# Patient Record
Sex: MALE | NOT HISPANIC OR LATINO | Employment: OTHER | URBAN - METROPOLITAN AREA
[De-identification: names, ages, dates, MRNs, and addresses within clinical notes are randomized per-mention and may not be internally consistent; named-entity substitution may affect disease eponyms.]

---

## 2023-04-24 ENCOUNTER — TELEPHONE (OUTPATIENT)
Dept: PRIMARY CARE | Facility: CLINIC | Age: 78
End: 2023-04-24
Payer: MEDICARE

## 2023-04-24 DIAGNOSIS — I25.2 MI, OLD: Primary | ICD-10-CM

## 2023-04-24 DIAGNOSIS — I25.2 MI, OLD: ICD-10-CM

## 2023-04-24 RX ORDER — CLOPIDOGREL BISULFATE 75 MG/1
75 TABLET ORAL SEE ADMIN INSTRUCTIONS
Qty: 135 TABLET | Refills: 1 | Status: SHIPPED | OUTPATIENT
Start: 2023-04-24 | End: 2023-04-25

## 2023-04-24 RX ORDER — CLOPIDOGREL BISULFATE 75 MG/1
75 TABLET ORAL SEE ADMIN INSTRUCTIONS
COMMUNITY
End: 2023-04-24 | Stop reason: SDUPTHER

## 2023-04-24 NOTE — TELEPHONE ENCOUNTER
Patient's Wife called in asked for a refill request from Dr. Dumont on his calcitriol, also patient was admitted to [facility] yesterday could not walk, just FYI, TY AM

## 2023-04-25 RX ORDER — POLYETHYLENE GLYCOL 1450
POWDER (GRAM) MISCELLANEOUS
COMMUNITY
End: 2023-05-26 | Stop reason: ALTCHOICE

## 2023-04-25 RX ORDER — MULTIVIT-MIN/FA/LYCOPEN/LUTEIN .4-300-25
1 TABLET ORAL DAILY
COMMUNITY
Start: 2017-07-20

## 2023-04-25 RX ORDER — ALPRAZOLAM 1 MG/1
1 TABLET ORAL NIGHTLY
COMMUNITY
End: 2024-03-12 | Stop reason: SDUPTHER

## 2023-04-25 RX ORDER — CLOPIDOGREL BISULFATE 75 MG/1
TABLET ORAL
Qty: 90 TABLET | Refills: 3 | Status: SHIPPED | OUTPATIENT
Start: 2023-04-25 | End: 2024-06-03

## 2023-04-25 RX ORDER — ACETAMINOPHEN, DIPHENHYDRAMINE HCL, PHENYLEPHRINE HCL 325; 25; 5 MG/1; MG/1; MG/1
TABLET ORAL
COMMUNITY

## 2023-04-25 RX ORDER — LEVOTHYROXINE SODIUM 100 UG/1
1 TABLET ORAL DAILY
COMMUNITY
Start: 2022-06-27 | End: 2023-06-21 | Stop reason: SDUPTHER

## 2023-04-25 RX ORDER — METOPROLOL SUCCINATE 25 MG/1
25 TABLET, EXTENDED RELEASE ORAL DAILY
COMMUNITY
End: 2023-12-15 | Stop reason: SDUPTHER

## 2023-04-25 RX ORDER — MIRTAZAPINE 30 MG/1
30 TABLET, FILM COATED ORAL NIGHTLY
COMMUNITY
End: 2023-05-30

## 2023-04-25 RX ORDER — LEVOTHYROXINE SODIUM 150 UG/1
1 TABLET ORAL DAILY
COMMUNITY
Start: 2022-05-15 | End: 2023-05-26 | Stop reason: ALTCHOICE

## 2023-04-25 RX ORDER — ROSUVASTATIN CALCIUM 5 MG/1
5 TABLET, COATED ORAL DAILY
COMMUNITY
End: 2023-05-30 | Stop reason: ALTCHOICE

## 2023-04-25 RX ORDER — CALCITRIOL 0.25 UG/1
CAPSULE ORAL
COMMUNITY
Start: 2019-10-08 | End: 2023-06-23 | Stop reason: SDUPTHER

## 2023-04-25 NOTE — TELEPHONE ENCOUNTER
Wife called in with update, Seen by Neurology Dr. Her possible parkinson's would like for you to look at his chart, Advise?

## 2023-05-03 ENCOUNTER — NURSING HOME VISIT (OUTPATIENT)
Dept: POST ACUTE CARE | Facility: EXTERNAL LOCATION | Age: 78
End: 2023-05-03
Payer: MEDICARE

## 2023-05-03 DIAGNOSIS — F02.B0 MODERATE LATE ONSET ALZHEIMER'S DEMENTIA, UNSPECIFIED WHETHER BEHAVIORAL, PSYCHOTIC, OR MOOD DISTURBANCE OR ANXIETY (MULTI): ICD-10-CM

## 2023-05-03 DIAGNOSIS — J18.9 PNEUMONIA DUE TO INFECTIOUS ORGANISM, UNSPECIFIED LATERALITY, UNSPECIFIED PART OF LUNG: Primary | ICD-10-CM

## 2023-05-03 DIAGNOSIS — N18.32 STAGE 3B CHRONIC KIDNEY DISEASE (MULTI): ICD-10-CM

## 2023-05-03 DIAGNOSIS — G30.1 MODERATE LATE ONSET ALZHEIMER'S DEMENTIA, UNSPECIFIED WHETHER BEHAVIORAL, PSYCHOTIC, OR MOOD DISTURBANCE OR ANXIETY (MULTI): ICD-10-CM

## 2023-05-03 DIAGNOSIS — I15.9 SECONDARY HYPERTENSION: ICD-10-CM

## 2023-05-03 PROCEDURE — 99306 1ST NF CARE HIGH MDM 50: CPT | Performed by: INTERNAL MEDICINE

## 2023-05-03 NOTE — PROGRESS NOTES
HISTORY & PHYSICAL    Subjective   Chief complaint: Maycol Christiansen is a 77 y.o. male who is a acute skilled care patient being seen and evaluated for multiple medical problems.  Patient presents for weakness.    HPI:  Patient is a 77 year old male with a past medical history of CKD, GERD, and dysphagia. Patient was presented to the ED with weakness over the past 3 to 4 days, decreased appetite, encephalopathy. Imaging of brain showed no cute findings. X-ray showed possible developing infiltrate. He was on 5 L of oxygen and then was reduced to 3 L. Pt was stable and discharged to a skilled nursing facility for further care and therapy.         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    Personal history of other diseases of the circulatory system 02/16/2015    History of orthostatic hypotension    Personal history of other endocrine, nutritional and metabolic disease 11/04/2015    History of hypothyroidism    Personal history of other mental and behavioral disorders 10/08/2020    History of dementia    Personal history of transient ischemic attack (TIA), and cerebral infarction without residual deficits 07/31/2017    History of stroke    Pneumonia     Weakness        Past Surgical History:   Procedure Laterality Date    HAND SURGERY  04/08/2013    Hand Surgery                                                                                                                                                          KNEE SURGERY  04/08/2013    Knee Surgery    MR HEAD ANGIO WO IV CONTRAST  4/5/2015    MR HEAD ANGIO WO IV CONTRAST 4/5/2015 [facility] CLINICAL LEGACY    MR HEAD ANGIO WO IV CONTRAST   1/25/2017    MR HEAD ANGIO WO IV CONTRAST 1/25/2017 [facility]    MR HEAD ANGIO WO IV CONTRAST  4/24/2023    MR HEAD ANGIO WO IV CONTRAST [facility]    MR NECK ANGIO WO IV CONTRAST  4/5/2015    MR NECK ANGIO WO IV CONTRAST 4/5/2015 [facility]    MR NECK ANGIO WO IV CONTRAST  1/25/2017    MR NECK ANGIO WO IV CONTRAST 1/25/2017 [facility]    MR NECK ANGIO WO IV CONTRAST  4/24/2023    MR NECK ANGIO WO IV CONTRAST [facility]    OTHER SURGICAL HISTORY  01/24/2022    Cystoscopy    OTHER SURGICAL HISTORY  01/24/2022    Hernia repair    OTHER SURGICAL HISTORY  11/04/2015    Incision Of Uvula       Family History   Problem Relation Name Age of Onset    No Known Problems Mother      No Known Problems Father         Social History     Socioeconomic History    Marital status:      Spouse name: Not on file    Number of children: Not on file    Years of education: Not on file    Highest education level: Not on file   Occupational History    Not on file   Tobacco Use    Smoking status: Not on file    Smokeless tobacco: Not on file   Vaping Use    Vaping status: Not on file   Substance and Sexual Activity    Alcohol use: Not on file    Drug use: Not on file    Sexual activity: Not on file   Other Topics Concern    Not on file   Social History Narrative    Not on file     Social Determinants of Health     Financial Resource Strain: Not on file   Food Insecurity: Not on file   Transportation Needs: Not on file   Physical Activity: Not on file   Stress: Not on file   Social Connections: Not on file   Intimate Partner Violence: Not on file   Housing Stability: Not on file       Vital signs: weight 157 lbs (admission)    Objective   Physical Exam  Vitals reviewed.   Constitutional:       Appearance: Normal appearance.   HENT:      Head: Normocephalic and atraumatic.   Cardiovascular:      Rate and Rhythm: Normal rate and regular rhythm.   Pulmonary:      Effort: Pulmonary effort is normal.      Breath  sounds: Normal breath sounds.   Abdominal:      General: Bowel sounds are normal.      Palpations: Abdomen is soft.   Musculoskeletal:      Cervical back: Neck supple.   Skin:     General: Skin is warm and dry.   Neurological:      General: No focal deficit present.      Mental Status: He is alert.   Psychiatric:         Mood and Affect: Mood normal.         Behavior: Behavior is cooperative.         Assessment/Plan   Problem List Items Addressed This Visit    None    Medications, treatments, and labs reviewed  Continue medications and treatments as listed in [facility]    Scribe Attestation  I, Yusuf Sunibmanasa   attest that this documentation has been prepared under the direction and in the presence of Agnieszka Peguero MD.    Provider Attestation - Scribe documentation  All medical record entries made by the Scribe were at my direction and personally dictated by me. I have reviewed the chart and agree that the record accurately reflects my personal performance of the history, physical exam, discussion and plan.    Agnieszka Peguero MD

## 2023-05-03 NOTE — LETTER
Patient: Maycol Christiansen  : 1945    Encounter Date: 2023    HISTORY & PHYSICAL    Subjective  Chief complaint: Maycol Christiansen is a 77 y.o. male who is a acute skilled care patient being seen and evaluated for multiple medical problems.  Patient presents for weakness.    HPI:  Patient is a 77 year old male with a past medical history of CKD, GERD, and dysphagia. Patient was presented to the ED with weakness over the past 3 to 4 days, decreased appetite, encephalopathy. Imaging of brain showed no cute findings. X-ray showed possible developing infiltrate. He was on 5 L of oxygen and then was reduced to 3 L. Pt was stable and discharged to a skilled nursing facility for further care and therapy.         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   • Personal history of other diseases of the circulatory system 2015    History of orthostatic hypotension   • Personal history of other endocrine, nutritional and metabolic disease 2015    History of hypothyroidism   • Personal history of other mental and behavioral disorders 10/08/2020    History of dementia   • Personal history of transient ischemic attack (TIA), and cerebral infarction without residual deficits 2017    History of stroke   • Pneumonia    • Weakness        Past Surgical History:   Procedure Laterality Date   • HAND SURGERY  2013    Hand Surgery                                                                                                                                                         • KNEE SURGERY  2013    Knee Surgery   • MR HEAD ANGIO WO IV CONTRAST  2015      HEAD ANGIO WO IV CONTRAST 4/5/2015 [facility] CLINICAL LEGACY   • MR HEAD ANGIO WO IV CONTRAST  1/25/2017    MR HEAD ANGIO WO IV CONTRAST 1/25/2017 [facility] CLINICAL LEGACY   • MR HEAD ANGIO WO IV CONTRAST  4/24/2023    MR HEAD ANGIO WO IV CONTRAST [facility]   • MR NECK ANGIO WO IV CONTRAST  4/5/2015    MR NECK ANGIO WO IV CONTRAST 4/5/2015 [facility] CLINICAL LEGACY   • MR NECK ANGIO WO IV CONTRAST  1/25/2017    MR NECK ANGIO WO IV CONTRAST 1/25/2017 [facility] CLINICAL LEGACY   • MR NECK ANGIO WO IV CONTRAST  4/24/2023    MR NECK ANGIO WO IV CONTRAST [facility]   • OTHER SURGICAL HISTORY  01/24/2022    Cystoscopy   • OTHER SURGICAL HISTORY  01/24/2022    Hernia repair   • OTHER SURGICAL HISTORY  11/04/2015    Incision Of Uvula       Family History   Problem Relation Name Age of Onset   • No Known Problems Mother     • No Known Problems Father         Social History     Socioeconomic History   • Marital status:      Spouse name: Not on file   • Number of children: Not on file   • Years of education: Not on file   • Highest education level: Not on file   Occupational History   • Not on file   Tobacco Use   • Smoking status: Not on file   • Smokeless tobacco: Not on file   Vaping Use   • Vaping status: Not on file   Substance and Sexual Activity   • Alcohol use: Not on file   • Drug use: Not on file   • Sexual activity: Not on file   Other Topics Concern   • Not on file   Social History Narrative   • Not on file     Social Determinants of Health     Financial Resource Strain: Not on file   Food Insecurity: Not on file   Transportation Needs: Not on file   Physical Activity: Not on file   Stress: Not on file   Social Connections: Not on file   Intimate Partner Violence: Not on file   Housing Stability: Not on file       Vital signs: weight 157 lbs (admission)    Objective  Physical Exam  Vitals reviewed.   Constitutional:       Appearance: Normal appearance.   HENT:      Head: Normocephalic and atraumatic.   Cardiovascular:      Rate and  Rhythm: Normal rate and regular rhythm.   Pulmonary:      Effort: Pulmonary effort is normal.      Breath sounds: Normal breath sounds.   Abdominal:      General: Bowel sounds are normal.      Palpations: Abdomen is soft.   Musculoskeletal:      Cervical back: Neck supple.   Skin:     General: Skin is warm and dry.   Neurological:      General: No focal deficit present.      Mental Status: He is alert.   Psychiatric:         Mood and Affect: Mood normal.         Behavior: Behavior is cooperative.         Assessment/Plan  Problem List Items Addressed This Visit    None    Medications, treatments, and labs reviewed  Continue medications and treatments as listed in [facility]    Scribe Attestation  I, Nette Mcdonald Scribe   attest that this documentation has been prepared under the direction and in the presence of Agnieszka Peguero MD.    Provider Attestation - Scribe documentation  All medical record entries made by the Scribe were at my direction and personally dictated by me. I have reviewed the chart and agree that the record accurately reflects my personal performance of the history, physical exam, discussion and plan.    Agnieszka Peguero MD          Electronically Signed By: Agnieszka Peguero MD   5/3/23  8:09 PM

## 2023-05-05 ENCOUNTER — NURSING HOME VISIT (OUTPATIENT)
Dept: POST ACUTE CARE | Facility: EXTERNAL LOCATION | Age: 78
End: 2023-05-05
Payer: MEDICARE

## 2023-05-05 DIAGNOSIS — F02.B11 MODERATE LATE ONSET ALZHEIMER'S DEMENTIA WITH AGITATION (MULTI): ICD-10-CM

## 2023-05-05 DIAGNOSIS — G30.1 MODERATE LATE ONSET ALZHEIMER'S DEMENTIA WITH AGITATION (MULTI): ICD-10-CM

## 2023-05-05 DIAGNOSIS — R53.1 WEAKNESS: ICD-10-CM

## 2023-05-05 PROCEDURE — 99308 SBSQ NF CARE LOW MDM 20: CPT | Performed by: INTERNAL MEDICINE

## 2023-05-08 PROBLEM — R53.1 WEAKNESS: Status: ACTIVE | Noted: 2023-05-08

## 2023-05-08 NOTE — PROGRESS NOTES
PROGRESS NOTE    Subjective   Chief complaint: Maycol Christiansen is a 77 y.o. male who is an acute skilled patient being seen and evaluated for weakness    HPI:  Patient continues to work in therapy due to weakness and debility. He requires mod/max assist for all transfers and ADL's. No new concerns at this time. No acute distress.       Objective   Vital signs:   121/76, 98%    Physical Exam  Constitutional:       General: He is not in acute distress.  Eyes:      Extraocular Movements: Extraocular movements intact.   Cardiovascular:      Rate and Rhythm: Normal rate and regular rhythm.   Pulmonary:      Effort: Pulmonary effort is normal.      Breath sounds: Normal breath sounds.   Abdominal:      General: Bowel sounds are normal.      Palpations: Abdomen is soft.   Musculoskeletal:      Cervical back: Neck supple.      Right lower leg: No edema.      Left lower leg: No edema.   Neurological:      Mental Status: He is alert.   Psychiatric:         Mood and Affect: Mood normal.         Behavior: Behavior is cooperative.         Assessment/Plan   Problem List Items Addressed This Visit          Nervous    Moderate late onset Alzheimer's dementia (CMS/HCC)     Mentation at baseline            Other    Weakness     Continue therapy          Medications, treatments, and labs reviewed  Continue medications and treatments as listed in [facility]    Agnieszka Peguero MD    1. Weakness        2. Moderate late onset Alzheimer's dementia with agitation (CMS/HCC)             Scribe Attestation  By signing my name below, I, Irving Parker   attest that this documentation has been prepared under the direction and in the presence of Agnieszka Peguero MD.    Provider Attestation - Scribe documentation  All medical record entries made by the Scribe were at my direction and personally dictated by me. I have reviewed the chart and agree that the record accurately reflects my personal performance of the history, physical exam, discussion and  plan.

## 2023-05-10 ENCOUNTER — NURSING HOME VISIT (OUTPATIENT)
Dept: POST ACUTE CARE | Facility: EXTERNAL LOCATION | Age: 78
End: 2023-05-10
Payer: MEDICARE

## 2023-05-10 DIAGNOSIS — R53.1 WEAKNESS: ICD-10-CM

## 2023-05-10 DIAGNOSIS — F02.B0 MODERATE LATE ONSET ALZHEIMER'S DEMENTIA, UNSPECIFIED WHETHER BEHAVIORAL, PSYCHOTIC, OR MOOD DISTURBANCE OR ANXIETY (MULTI): ICD-10-CM

## 2023-05-10 DIAGNOSIS — G30.1 MODERATE LATE ONSET ALZHEIMER'S DEMENTIA, UNSPECIFIED WHETHER BEHAVIORAL, PSYCHOTIC, OR MOOD DISTURBANCE OR ANXIETY (MULTI): ICD-10-CM

## 2023-05-10 DIAGNOSIS — N18.32 STAGE 3B CHRONIC KIDNEY DISEASE (MULTI): ICD-10-CM

## 2023-05-10 DIAGNOSIS — I15.9 SECONDARY HYPERTENSION: ICD-10-CM

## 2023-05-10 PROCEDURE — 99309 SBSQ NF CARE MODERATE MDM 30: CPT | Performed by: INTERNAL MEDICINE

## 2023-05-10 NOTE — LETTER
Patient: Maycol Christiansen  : 1945    Encounter Date: 05/10/2023    PROGRESS NOTE    Subjective  Chief complaint: Maycol Christiansen is a 77 y.o. male who is a long term care patient being seen and evaluated for monthly general medical care and follow-up.    HPI:   patient presents for general medical care and f/u.  Patient seen and examined at bedside.  No issues per nursing.  Patient has no acute complaints.   patient has dementia and requires assist with ADLs.    HTN,  Denies chest pain and headache.  Patient with CKD which is stable. Denies constitutional symptoms. Patient continues to work in therapy.  He is up ambulating several feet with a walker and contact-guard assistance.      Objective  Vital signs:  124/76, 98%    Physical Exam  Constitutional:       General: He is not in acute distress.  Eyes:      Extraocular Movements: Extraocular movements intact.   Cardiovascular:      Rate and Rhythm: Normal rate and regular rhythm.   Pulmonary:      Effort: Pulmonary effort is normal.      Breath sounds: Normal breath sounds.   Abdominal:      General: Bowel sounds are normal.      Palpations: Abdomen is soft.   Musculoskeletal:      Cervical back: Neck supple.      Right lower leg: Edema present.      Left lower leg: Edema present.   Neurological:      Mental Status: He is alert.   Psychiatric:         Mood and Affect: Mood normal.         Behavior: Behavior is cooperative.         Assessment/Plan  Problem List Items Addressed This Visit          Nervous    Moderate late onset Alzheimer's dementia (CMS/HCC)     Mentation at baseline  ST for cognition              Circulatory    Secondary hypertension     BP at goal  Monitor BP  Continue antihypertensives            Genitourinary    Stage 3b chronic kidney disease     Stable  Monitor labs            Other    Weakness     Continue therapy          Medications, treatments, and labs reviewed  Continue medications and treatments as listed in [facility]    Scribe  Attestation  By signing my name below, I, Irving Parker   attest that this documentation has been prepared under the direction and in the presence of Agnieszka Peguero MD.    Provider Attestation - Scribe documentation  All medical record entries made by the Scribe were at my direction and personally dictated by me. I have reviewed the chart and agree that the record accurately reflects my personal performance of the history, physical exam, discussion and plan.    1. Moderate late onset Alzheimer's dementia, unspecified whether behavioral, psychotic, or mood disturbance or anxiety (CMS/HCC)        2. Secondary hypertension        3. Weakness        4. Stage 3b chronic kidney disease               Electronically Signed By: Agnieszka Peguero MD   5/15/23 11:16 AM

## 2023-05-12 ENCOUNTER — NURSING HOME VISIT (OUTPATIENT)
Dept: POST ACUTE CARE | Facility: EXTERNAL LOCATION | Age: 78
End: 2023-05-12
Payer: MEDICARE

## 2023-05-12 DIAGNOSIS — G30.1 MODERATE LATE ONSET ALZHEIMER'S DEMENTIA, UNSPECIFIED WHETHER BEHAVIORAL, PSYCHOTIC, OR MOOD DISTURBANCE OR ANXIETY (MULTI): ICD-10-CM

## 2023-05-12 DIAGNOSIS — R53.1 WEAKNESS: ICD-10-CM

## 2023-05-12 DIAGNOSIS — F02.B0 MODERATE LATE ONSET ALZHEIMER'S DEMENTIA, UNSPECIFIED WHETHER BEHAVIORAL, PSYCHOTIC, OR MOOD DISTURBANCE OR ANXIETY (MULTI): ICD-10-CM

## 2023-05-12 PROCEDURE — 99308 SBSQ NF CARE LOW MDM 20: CPT | Performed by: INTERNAL MEDICINE

## 2023-05-12 NOTE — LETTER
Patient: Maycol Christiansen  : 1945    Encounter Date: 2023    PROGRESS NOTE    Subjective  Chief complaint: Maycol Christiansen is a 77 y.o. male who is an acute skilled patient being seen and evaluated for weakness    HPI:  Patient with ALZ/Dementia continues to work with therapy.  Patient requires assistance with transfers ADLs and mobility. He is able to ambulate with contact-guard assist and walker.  No new issues or concerns.  No acute distress.      Objective  Vital signs: 127/67, 98%    Physical Exam  Constitutional:       General: He is not in acute distress.  Eyes:      Extraocular Movements: Extraocular movements intact.   Cardiovascular:      Rate and Rhythm: Normal rate and regular rhythm.   Pulmonary:      Effort: Pulmonary effort is normal.      Breath sounds: Normal breath sounds.   Abdominal:      General: Bowel sounds are normal.      Palpations: Abdomen is soft.   Musculoskeletal:      Cervical back: Neck supple.      Right lower leg: Edema present.      Left lower leg: Edema present.   Neurological:      Mental Status: He is alert.   Psychiatric:         Mood and Affect: Mood normal.         Behavior: Behavior is cooperative.         Assessment/Plan  Problem List Items Addressed This Visit          Nervous    Moderate late onset Alzheimer's dementia (CMS/HCC)     Mentation at baseline  ST for cognition              Other    Weakness     Continue therapy          Medications, treatments, and labs reviewed  Continue medications and treatments as listed in [facility]    Agnieszka Peguero MD    1. Moderate late onset Alzheimer's dementia, unspecified whether behavioral, psychotic, or mood disturbance or anxiety (CMS/HCC)        2. Weakness             Scribe Attestation  By signing my name below, IFarzaneh, Scribe   attest that this documentation has been prepared under the direction and in the presence of Agnieszka Peguero MD.    Provider Attestation - Scribe documentation  All medical record entries  made by the Scribe were at my direction and personally dictated by me. I have reviewed the chart and agree that the record accurately reflects my personal performance of the history, physical exam, discussion and plan.      Electronically Signed By: Agnieszka Peguero MD   5/16/23 12:16 PM

## 2023-05-15 NOTE — PROGRESS NOTES
PROGRESS NOTE    Subjective   Chief complaint: Maycol Christiansen is a 77 y.o. male who is a long term care patient being seen and evaluated for monthly general medical care and follow-up.    HPI:   patient presents for general medical care and f/u.  Patient seen and examined at bedside.  No issues per nursing.  Patient has no acute complaints.   patient has dementia and requires assist with ADLs.    HTN,  Denies chest pain and headache.  Patient with CKD which is stable. Denies constitutional symptoms. Patient continues to work in therapy.  He is up ambulating several feet with a walker and contact-guard assistance.      Objective   Vital signs:  124/76, 98%    Physical Exam  Constitutional:       General: He is not in acute distress.  Eyes:      Extraocular Movements: Extraocular movements intact.   Cardiovascular:      Rate and Rhythm: Normal rate and regular rhythm.   Pulmonary:      Effort: Pulmonary effort is normal.      Breath sounds: Normal breath sounds.   Abdominal:      General: Bowel sounds are normal.      Palpations: Abdomen is soft.   Musculoskeletal:      Cervical back: Neck supple.      Right lower leg: Edema present.      Left lower leg: Edema present.   Neurological:      Mental Status: He is alert.   Psychiatric:         Mood and Affect: Mood normal.         Behavior: Behavior is cooperative.         Assessment/Plan   Problem List Items Addressed This Visit          Nervous    Moderate late onset Alzheimer's dementia (CMS/HCC)     Mentation at baseline  ST for cognition              Circulatory    Secondary hypertension     BP at goal  Monitor BP  Continue antihypertensives            Genitourinary    Stage 3b chronic kidney disease     Stable  Monitor labs            Other    Weakness     Continue therapy          Medications, treatments, and labs reviewed  Continue medications and treatments as listed in [facility]    Scribe Attestation  By signing my name below, Farzaneh BROWNE, Scribe   attest that  this documentation has been prepared under the direction and in the presence of Agnieszka Peguero MD.    Provider Attestation - Scribe documentation  All medical record entries made by the Scribe were at my direction and personally dictated by me. I have reviewed the chart and agree that the record accurately reflects my personal performance of the history, physical exam, discussion and plan.    1. Moderate late onset Alzheimer's dementia, unspecified whether behavioral, psychotic, or mood disturbance or anxiety (CMS/HCC)        2. Secondary hypertension        3. Weakness        4. Stage 3b chronic kidney disease

## 2023-05-16 NOTE — PROGRESS NOTES
PROGRESS NOTE    Subjective   Chief complaint: Maycol Christiansen is a 77 y.o. male who is an acute skilled patient being seen and evaluated for weakness    HPI:  Patient with ALZ/Dementia continues to work with therapy.  Patient requires assistance with transfers ADLs and mobility. He is able to ambulate with contact-guard assist and walker.  No new issues or concerns.  No acute distress.      Objective   Vital signs: 127/67, 98%    Physical Exam  Constitutional:       General: He is not in acute distress.  Eyes:      Extraocular Movements: Extraocular movements intact.   Cardiovascular:      Rate and Rhythm: Normal rate and regular rhythm.   Pulmonary:      Effort: Pulmonary effort is normal.      Breath sounds: Normal breath sounds.   Abdominal:      General: Bowel sounds are normal.      Palpations: Abdomen is soft.   Musculoskeletal:      Cervical back: Neck supple.      Right lower leg: Edema present.      Left lower leg: Edema present.   Neurological:      Mental Status: He is alert.   Psychiatric:         Mood and Affect: Mood normal.         Behavior: Behavior is cooperative.         Assessment/Plan   Problem List Items Addressed This Visit          Nervous    Moderate late onset Alzheimer's dementia (CMS/HCC)     Mentation at baseline  ST for cognition              Other    Weakness     Continue therapy          Medications, treatments, and labs reviewed  Continue medications and treatments as listed in [facility]    Agnieszka Peguero MD    1. Moderate late onset Alzheimer's dementia, unspecified whether behavioral, psychotic, or mood disturbance or anxiety (CMS/HCC)        2. Weakness             Scribe Attestation  By signing my name below, Farzaneh BROWNE Scribe   attest that this documentation has been prepared under the direction and in the presence of Agnieszka Peguero MD.    Provider Attestation - Scribe documentation  All medical record entries made by the Scribe were at my direction and personally dictated by  me. I have reviewed the chart and agree that the record accurately reflects my personal performance of the history, physical exam, discussion and plan.

## 2023-05-17 ENCOUNTER — NURSING HOME VISIT (OUTPATIENT)
Dept: POST ACUTE CARE | Facility: EXTERNAL LOCATION | Age: 78
End: 2023-05-17
Payer: MEDICARE

## 2023-05-17 DIAGNOSIS — F02.B0 MODERATE LATE ONSET ALZHEIMER'S DEMENTIA, UNSPECIFIED WHETHER BEHAVIORAL, PSYCHOTIC, OR MOOD DISTURBANCE OR ANXIETY (MULTI): ICD-10-CM

## 2023-05-17 DIAGNOSIS — G30.1 MODERATE LATE ONSET ALZHEIMER'S DEMENTIA, UNSPECIFIED WHETHER BEHAVIORAL, PSYCHOTIC, OR MOOD DISTURBANCE OR ANXIETY (MULTI): ICD-10-CM

## 2023-05-17 DIAGNOSIS — R53.1 WEAKNESS: ICD-10-CM

## 2023-05-17 PROCEDURE — 99308 SBSQ NF CARE LOW MDM 20: CPT | Performed by: INTERNAL MEDICINE

## 2023-05-17 NOTE — LETTER
Patient: Maycol Christiansen  : 1945    Encounter Date: 2023    PROGRESS NOTE    Subjective  Chief complaint: Maycol Christiansen is a 77 y.o. male who is an acute skilled patient being seen and evaluated for weakness    HPI:  Patient with ALZ/Dementia continues to work with therapy.  Patient requires assistance with transfers ADLs and mobility. He walks with contact-guard assist and walker.  No new issues or concerns.        Objective  Vital signs: 126/64, 98%    Physical Exam  Constitutional:       General: He is not in acute distress.  Eyes:      Extraocular Movements: Extraocular movements intact.   Cardiovascular:      Rate and Rhythm: Normal rate and regular rhythm.   Pulmonary:      Effort: Pulmonary effort is normal.      Breath sounds: Normal breath sounds.   Abdominal:      General: Bowel sounds are normal.      Palpations: Abdomen is soft.   Musculoskeletal:      Cervical back: Neck supple.      Right lower leg: Edema present.      Left lower leg: Edema present.   Neurological:      Mental Status: He is alert.   Psychiatric:         Mood and Affect: Mood normal.         Behavior: Behavior is cooperative.         Assessment/Plan  Problem List Items Addressed This Visit          Nervous    Moderate late onset Alzheimer's dementia (CMS/HCC)     Mentation at baseline  ST for cognition              Other    Weakness     Continue therapy        Medications, treatments, and labs reviewed  Continue medications and treatments as listed in PCC    Agnieszka Peguero MD    1. Moderate late onset Alzheimer's dementia, unspecified whether behavioral, psychotic, or mood disturbance or anxiety (CMS/HCC)        2. Weakness             Scribe Attestation  By signing my name below, IFarzaneh, Irving   attest that this documentation has been prepared under the direction and in the presence of Agnieszka Peguero MD.    Provider Attestation - Scribe documentation  All medical record entries made by the Scribe were at my  direction and personally dictated by me. I have reviewed the chart and agree that the record accurately reflects my personal performance of the history, physical exam, discussion and plan.      Electronically Signed By: Agnieszka Peguero MD   5/17/23  8:24 PM

## 2023-05-18 NOTE — PROGRESS NOTES
PROGRESS NOTE    Subjective   Chief complaint: Maycol Christiansen is a 77 y.o. male who is an acute skilled patient being seen and evaluated for weakness    HPI:  Patient with ALZ/Dementia continues to work with therapy.  Patient requires assistance with transfers ADLs and mobility. He walks with contact-guard assist and walker.  No new issues or concerns.        Objective   Vital signs: 126/64, 98%    Physical Exam  Constitutional:       General: He is not in acute distress.  Eyes:      Extraocular Movements: Extraocular movements intact.   Cardiovascular:      Rate and Rhythm: Normal rate and regular rhythm.   Pulmonary:      Effort: Pulmonary effort is normal.      Breath sounds: Normal breath sounds.   Abdominal:      General: Bowel sounds are normal.      Palpations: Abdomen is soft.   Musculoskeletal:      Cervical back: Neck supple.      Right lower leg: Edema present.      Left lower leg: Edema present.   Neurological:      Mental Status: He is alert.   Psychiatric:         Mood and Affect: Mood normal.         Behavior: Behavior is cooperative.         Assessment/Plan   Problem List Items Addressed This Visit          Nervous    Moderate late onset Alzheimer's dementia (CMS/HCC)     Mentation at baseline  ST for cognition              Other    Weakness     Continue therapy        Medications, treatments, and labs reviewed  Continue medications and treatments as listed in PCC    Agnieszka Peguero MD    1. Moderate late onset Alzheimer's dementia, unspecified whether behavioral, psychotic, or mood disturbance or anxiety (CMS/HCC)        2. Weakness             Scribe Attestation  By signing my name below, IFarzaneh Scribe   attest that this documentation has been prepared under the direction and in the presence of Agnieszka Peguero MD.    Provider Attestation - Scribe documentation  All medical record entries made by the Scribe were at my direction and personally dictated by me. I have reviewed the chart and  agree that the record accurately reflects my personal performance of the history, physical exam, discussion and plan.

## 2023-05-19 ENCOUNTER — NURSING HOME VISIT (OUTPATIENT)
Dept: POST ACUTE CARE | Facility: EXTERNAL LOCATION | Age: 78
End: 2023-05-19
Payer: MEDICARE

## 2023-05-19 DIAGNOSIS — R53.1 WEAKNESS: ICD-10-CM

## 2023-05-19 DIAGNOSIS — F02.B0 MODERATE LATE ONSET ALZHEIMER'S DEMENTIA, UNSPECIFIED WHETHER BEHAVIORAL, PSYCHOTIC, OR MOOD DISTURBANCE OR ANXIETY (MULTI): ICD-10-CM

## 2023-05-19 DIAGNOSIS — R60.9 EDEMA, UNSPECIFIED TYPE: ICD-10-CM

## 2023-05-19 DIAGNOSIS — G30.1 MODERATE LATE ONSET ALZHEIMER'S DEMENTIA, UNSPECIFIED WHETHER BEHAVIORAL, PSYCHOTIC, OR MOOD DISTURBANCE OR ANXIETY (MULTI): ICD-10-CM

## 2023-05-19 PROCEDURE — 99309 SBSQ NF CARE MODERATE MDM 30: CPT | Performed by: INTERNAL MEDICINE

## 2023-05-19 NOTE — LETTER
Patient: Maycol Christiansen  : 1945    Encounter Date: 2023    PROGRESS NOTE    Subjective  Chief complaint: Maycol Christiansen is a 77 y.o. male who is an acute skilled patient being seen and evaluated for weakness    HPI:  Patient with ALZ/Dementia continues to work with therapy. He walks with contact-guard assist and walker.  No new issues or concerns.  No acute distress.       Objective  Vital signs: 124/66, 98%    Physical Exam  Constitutional:       General: He is not in acute distress.  Eyes:      Extraocular Movements: Extraocular movements intact.   Cardiovascular:      Rate and Rhythm: Normal rate and regular rhythm.   Pulmonary:      Effort: Pulmonary effort is normal.      Breath sounds: Normal breath sounds.   Abdominal:      General: Bowel sounds are normal.      Palpations: Abdomen is soft.   Musculoskeletal:      Cervical back: Neck supple.      Right lower leg: Edema present.      Left lower leg: Edema present.   Neurological:      Mental Status: He is alert.   Psychiatric:         Mood and Affect: Mood normal.         Behavior: Behavior is cooperative.         Assessment/Plan  Problem List Items Addressed This Visit          Nervous    Moderate late onset Alzheimer's dementia (CMS/HCC)     Mentation at baseline  ST for cognition              Other    Weakness     Continue therapy         Edema     Elevate BLE as tolerated  Monitor for worsening edema        Medications, treatments, and labs reviewed  Continue medications and treatments as listed in PCC    Agnieszka Peguero MD    1. Moderate late onset Alzheimer's dementia, unspecified whether behavioral, psychotic, or mood disturbance or anxiety (CMS/HCC)        2. Weakness        3. Edema, unspecified type             Scribe Attestation  By signing my name below, IFarzaneh, Scribe   attest that this documentation has been prepared under the direction and in the presence of Agnieszka Peguero MD.    Provider Attestation - Scribe  documentation  All medical record entries made by the Scribe were at my direction and personally dictated by me. I have reviewed the chart and agree that the record accurately reflects my personal performance of the history, physical exam, discussion and plan.      Electronically Signed By: Agnieszka Peguero MD   5/22/23  2:15 PM

## 2023-05-20 PROBLEM — R60.9 EDEMA: Status: ACTIVE | Noted: 2023-05-20

## 2023-05-20 NOTE — PROGRESS NOTES
PROGRESS NOTE    Subjective   Chief complaint: Maycol Crhistiansen is a 77 y.o. male who is an acute skilled patient being seen and evaluated for weakness    HPI:  Patient with ALZ/Dementia continues to work with therapy. He walks with contact-guard assist and walker.  No new issues or concerns.  No acute distress.       Objective   Vital signs: 124/66, 98%    Physical Exam  Constitutional:       General: He is not in acute distress.  Eyes:      Extraocular Movements: Extraocular movements intact.   Cardiovascular:      Rate and Rhythm: Normal rate and regular rhythm.   Pulmonary:      Effort: Pulmonary effort is normal.      Breath sounds: Normal breath sounds.   Abdominal:      General: Bowel sounds are normal.      Palpations: Abdomen is soft.   Musculoskeletal:      Cervical back: Neck supple.      Right lower leg: Edema present.      Left lower leg: Edema present.   Neurological:      Mental Status: He is alert.   Psychiatric:         Mood and Affect: Mood normal.         Behavior: Behavior is cooperative.         Assessment/Plan   Problem List Items Addressed This Visit          Nervous    Moderate late onset Alzheimer's dementia (CMS/HCC)     Mentation at baseline  ST for cognition              Other    Weakness     Continue therapy         Edema     Elevate BLE as tolerated  Monitor for worsening edema        Medications, treatments, and labs reviewed  Continue medications and treatments as listed in PCC    Agnieszka Peguero MD    1. Moderate late onset Alzheimer's dementia, unspecified whether behavioral, psychotic, or mood disturbance or anxiety (CMS/HCC)        2. Weakness        3. Edema, unspecified type             Scribe Attestation  By signing my name below, Farzaneh BROWNE Scribe   attest that this documentation has been prepared under the direction and in the presence of Agnieszka Peguero MD.    Provider Attestation - Scribe documentation  All medical record entries made by the Scribe were at my direction  and personally dictated by me. I have reviewed the chart and agree that the record accurately reflects my personal performance of the history, physical exam, discussion and plan.

## 2023-05-24 ENCOUNTER — NURSING HOME VISIT (OUTPATIENT)
Dept: POST ACUTE CARE | Facility: EXTERNAL LOCATION | Age: 78
End: 2023-05-24
Payer: MEDICARE

## 2023-05-24 DIAGNOSIS — F02.B0 MODERATE LATE ONSET ALZHEIMER'S DEMENTIA, UNSPECIFIED WHETHER BEHAVIORAL, PSYCHOTIC, OR MOOD DISTURBANCE OR ANXIETY (MULTI): ICD-10-CM

## 2023-05-24 DIAGNOSIS — G30.1 MODERATE LATE ONSET ALZHEIMER'S DEMENTIA, UNSPECIFIED WHETHER BEHAVIORAL, PSYCHOTIC, OR MOOD DISTURBANCE OR ANXIETY (MULTI): ICD-10-CM

## 2023-05-24 DIAGNOSIS — R53.1 WEAKNESS: ICD-10-CM
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
## 2023-05-24 PROCEDURE — 99308 SBSQ NF CARE LOW MDM 20: CPT | Performed by: INTERNAL MEDICINE

## 2023-05-24 NOTE — LETTER
Patient: Maycol Christiansen  : 1945    Encounter Date: 2023    PROGRESS NOTE    Subjective  Chief complaint: Maycol Christiansen is a 77 y.o. male who is an acute skilled patient being seen and evaluated for weakness    HPI:  Patient with ALZ/Dementia continues to work with therapy. He walks with contact-guard assist and walker.  Patient had 2 falls and sent to Ed for eval. Scans and x-rays negative and no acute findings on exam. He denies pain or injuries. No new issues or concerns.      Objective  Vital signs: 132/74, 98%    Physical Exam  Constitutional:       General: He is not in acute distress.  Eyes:      Extraocular Movements: Extraocular movements intact.   Cardiovascular:      Rate and Rhythm: Normal rate and regular rhythm.   Pulmonary:      Effort: Pulmonary effort is normal.      Breath sounds: Normal breath sounds.   Abdominal:      General: Bowel sounds are normal.      Palpations: Abdomen is soft.   Musculoskeletal:         General: Normal range of motion.      Cervical back: Normal range of motion and neck supple.      Right lower leg: Edema present.      Left lower leg: Edema present.   Neurological:      Mental Status: He is alert.   Psychiatric:         Mood and Affect: Mood normal.         Behavior: Behavior is cooperative.         Assessment/Plan  Problem List Items Addressed This Visit          Nervous    Moderate late onset Alzheimer's dementia (CMS/HCC)     Mentation at baseline  ST for cognition                Other    Weakness     Continue therapy         Fall     No apparent injuries  ED visit with no acute findings  Denies pain or injury          Medications, treatments, and labs reviewed  Continue medications and treatments as listed in PCC    Agnieszka Peguero MD    1. Fall, initial encounter        2. Weakness        3. Moderate late onset Alzheimer's dementia, unspecified whether behavioral, psychotic, or mood disturbance or anxiety (CMS/HCC)             Scribe Attestation  By  signing my name below, I, Irving Parker   attest that this documentation has been prepared under the direction and in the presence of Agnieszka Peguero MD.    Provider Attestation - Scribe documentation  All medical record entries made by the Scribe were at my direction and personally dictated by me. I have reviewed the chart and agree that the record accurately reflects my personal performance of the history, physical exam, discussion and plan.      Electronically Signed By: Agnieszka Peguero MD   5/24/23  4:40 PM

## 2023-05-24 NOTE — PROGRESS NOTES
PROGRESS NOTE    Subjective   Chief complaint: Maycol Christiansen is a 77 y.o. male who is an acute skilled patient being seen and evaluated for weakness    HPI:  Patient with ALZ/Dementia continues to work with therapy. He walks with contact-guard assist and walker.  Patient had 2 falls and sent to Ed for eval. Scans and x-rays negative and no acute findings on exam. He denies pain or injuries. No new issues or concerns.      Objective   Vital signs: 132/74, 98%    Physical Exam  Constitutional:       General: He is not in acute distress.  Eyes:      Extraocular Movements: Extraocular movements intact.   Cardiovascular:      Rate and Rhythm: Normal rate and regular rhythm.   Pulmonary:      Effort: Pulmonary effort is normal.      Breath sounds: Normal breath sounds.   Abdominal:      General: Bowel sounds are normal.      Palpations: Abdomen is soft.   Musculoskeletal:         General: Normal range of motion.      Cervical back: Normal range of motion and neck supple.      Right lower leg: Edema present.      Left lower leg: Edema present.   Neurological:      Mental Status: He is alert.   Psychiatric:         Mood and Affect: Mood normal.         Behavior: Behavior is cooperative.         Assessment/Plan   Problem List Items Addressed This Visit          Nervous    Moderate late onset Alzheimer's dementia (CMS/HCC)     Mentation at baseline  ST for cognition                Other    Weakness     Continue therapy         Fall     No apparent injuries  ED visit with no acute findings  Denies pain or injury          Medications, treatments, and labs reviewed  Continue medications and treatments as listed in PCC    Agnieszka Peguero MD    1. Fall, initial encounter        2. Weakness        3. Moderate late onset Alzheimer's dementia, unspecified whether behavioral, psychotic, or mood disturbance or anxiety (CMS/HCC)             Scribe Attestation  By signing my name below, I, Farzaneh Conner, Scribe   attest that this  documentation has been prepared under the direction and in the presence of Agnieszka Peguero MD.    Provider Attestation - Scribe documentation  All medical record entries made by the Scribe were at my direction and personally dictated by me. I have reviewed the chart and agree that the record accurately reflects my personal performance of the history, physical exam, discussion and plan.

## 2023-05-26 ENCOUNTER — NURSING HOME VISIT (OUTPATIENT)
Dept: POST ACUTE CARE | Facility: EXTERNAL LOCATION | Age: 78
End: 2023-05-26
Payer: MEDICARE

## 2023-05-26 DIAGNOSIS — R53.1 WEAKNESS: ICD-10-CM

## 2023-05-26 DIAGNOSIS — F02.B0 MODERATE LATE ONSET ALZHEIMER'S DEMENTIA, UNSPECIFIED WHETHER BEHAVIORAL, PSYCHOTIC, OR MOOD DISTURBANCE OR ANXIETY (MULTI): ICD-10-CM

## 2023-05-26 DIAGNOSIS — G30.1 MODERATE LATE ONSET ALZHEIMER'S DEMENTIA, UNSPECIFIED WHETHER BEHAVIORAL, PSYCHOTIC, OR MOOD DISTURBANCE OR ANXIETY (MULTI): ICD-10-CM

## 2023-05-26 PROCEDURE — 99308 SBSQ NF CARE LOW MDM 20: CPT | Performed by: INTERNAL MEDICINE

## 2023-05-26 RX ORDER — CARVEDILOL 6.25 MG/1
TABLET ORAL 2 TIMES DAILY
COMMUNITY
Start: 2023-04-28 | End: 2024-03-28 | Stop reason: ENTERED-IN-ERROR

## 2023-05-26 NOTE — LETTER
Patient: Maycol Christiansen  : 1945    Encounter Date: 2023    PROGRESS NOTE    Subjective  Chief complaint: Maycol Christiansen is a 77 y.o. male who is an acute skilled patient being seen and evaluated for weakness    HPI:  Patient with ALZ/Dementia working in therapy. He is doing well and is ip walking several feet with walker and CGA to SB. No new concerns at this time. No acute distress.      Objective  Vital signs: 129/76, 98%    Physical Exam  Constitutional:       General: He is not in acute distress.  Eyes:      Extraocular Movements: Extraocular movements intact.   Cardiovascular:      Rate and Rhythm: Normal rate and regular rhythm.   Pulmonary:      Effort: Pulmonary effort is normal.      Breath sounds: Normal breath sounds.   Abdominal:      General: Bowel sounds are normal.      Palpations: Abdomen is soft.   Musculoskeletal:         General: Normal range of motion.      Cervical back: Normal range of motion and neck supple.      Right lower leg: Edema present.      Left lower leg: Edema present.   Neurological:      Mental Status: He is alert.   Psychiatric:         Mood and Affect: Mood normal.         Behavior: Behavior is cooperative.         Assessment/Plan  Problem List Items Addressed This Visit          Nervous    Moderate late onset Alzheimer's dementia (CMS/HCC)     Mentation at baseline  ST for cognition                Other    Weakness     Continue therapy          Medications, treatments, and labs reviewed  Continue medications and treatments as listed in PCC    Agnieszka Peguero MD    1. Weakness        2. Moderate late onset Alzheimer's dementia, unspecified whether behavioral, psychotic, or mood disturbance or anxiety (CMS/HCC)             Scribe Attestation  By signing my name below, IFarzaneh, Scribe   attest that this documentation has been prepared under the direction and in the presence of Agnieszka Peguero MD.    Provider Attestation - Scribe documentation  All medical  record entries made by the Scribe were at my direction and personally dictated by me. I have reviewed the chart and agree that the record accurately reflects my personal performance of the history, physical exam, discussion and plan.      Electronically Signed By: Agnieszka Peguero MD   5/30/23  7:44 PM

## 2023-05-30 ENCOUNTER — OFFICE VISIT (OUTPATIENT)
Dept: PRIMARY CARE | Facility: CLINIC | Age: 78
End: 2023-05-30
Payer: MEDICARE

## 2023-05-30 VITALS
HEART RATE: 61 BPM | OXYGEN SATURATION: 99 % | RESPIRATION RATE: 16 BRPM | TEMPERATURE: 98.2 F | WEIGHT: 148 LBS | HEIGHT: 70 IN | BODY MASS INDEX: 21.19 KG/M2 | SYSTOLIC BLOOD PRESSURE: 132 MMHG | DIASTOLIC BLOOD PRESSURE: 74 MMHG

## 2023-05-30 DIAGNOSIS — Z99.89 AMBULATES WITH CANE: ICD-10-CM

## 2023-05-30 DIAGNOSIS — M21.371 RIGHT FOOT DROP: ICD-10-CM

## 2023-05-30 PROCEDURE — 3075F SYST BP GE 130 - 139MM HG: CPT | Performed by: INTERNAL MEDICINE

## 2023-05-30 PROCEDURE — 1157F ADVNC CARE PLAN IN RCRD: CPT | Performed by: INTERNAL MEDICINE

## 2023-05-30 PROCEDURE — 3078F DIAST BP <80 MM HG: CPT | Performed by: INTERNAL MEDICINE

## 2023-05-30 PROCEDURE — 1159F MED LIST DOCD IN RCRD: CPT | Performed by: INTERNAL MEDICINE

## 2023-05-30 PROCEDURE — 1036F TOBACCO NON-USER: CPT | Performed by: INTERNAL MEDICINE

## 2023-05-30 PROCEDURE — 99214 OFFICE O/P EST MOD 30 MIN: CPT | Performed by: INTERNAL MEDICINE

## 2023-05-30 RX ORDER — ATORVASTATIN CALCIUM 10 MG/1
10 TABLET, FILM COATED ORAL DAILY
COMMUNITY
End: 2023-06-23 | Stop reason: ALTCHOICE

## 2023-05-30 RX ORDER — DOCUSATE SODIUM 100 MG/1
100 CAPSULE, LIQUID FILLED ORAL 2 TIMES DAILY
COMMUNITY

## 2023-05-30 ASSESSMENT — ENCOUNTER SYMPTOMS
LOSS OF SENSATION IN FEET: 1
OCCASIONAL FEELINGS OF UNSTEADINESS: 1
DEPRESSION: 0

## 2023-05-30 ASSESSMENT — ANXIETY QUESTIONNAIRES
5. BEING SO RESTLESS THAT IT IS HARD TO SIT STILL: MORE THAN HALF THE DAYS
IF YOU CHECKED OFF ANY PROBLEMS ON THIS QUESTIONNAIRE, HOW DIFFICULT HAVE THESE PROBLEMS MADE IT FOR YOU TO DO YOUR WORK, TAKE CARE OF THINGS AT HOME, OR GET ALONG WITH OTHER PEOPLE: SOMEWHAT DIFFICULT
1. FEELING NERVOUS, ANXIOUS, OR ON EDGE: MORE THAN HALF THE DAYS
GAD7 TOTAL SCORE: 17
6. BECOMING EASILY ANNOYED OR IRRITABLE: MORE THAN HALF THE DAYS
4. TROUBLE RELAXING: NEARLY EVERY DAY
3. WORRYING TOO MUCH ABOUT DIFFERENT THINGS: NEARLY EVERY DAY
7. FEELING AFRAID AS IF SOMETHING AWFUL MIGHT HAPPEN: MORE THAN HALF THE DAYS
2. NOT BEING ABLE TO STOP OR CONTROL WORRYING: NEARLY EVERY DAY

## 2023-05-30 ASSESSMENT — PATIENT HEALTH QUESTIONNAIRE - PHQ9
5. POOR APPETITE OR OVEREATING: SEVERAL DAYS
2. FEELING DOWN, DEPRESSED OR HOPELESS: MORE THAN HALF THE DAYS
1. LITTLE INTEREST OR PLEASURE IN DOING THINGS: MORE THAN HALF THE DAYS
4. FEELING TIRED OR HAVING LITTLE ENERGY: NEARLY EVERY DAY
3. TROUBLE FALLING OR STAYING ASLEEP OR SLEEPING TOO MUCH: SEVERAL DAYS
6. FEELING BAD ABOUT YOURSELF - OR THAT YOU ARE A FAILURE OR HAVE LET YOURSELF OR YOUR FAMILY DOWN: MORE THAN HALF THE DAYS
10. IF YOU CHECKED OFF ANY PROBLEMS, HOW DIFFICULT HAVE THESE PROBLEMS MADE IT FOR YOU TO DO YOUR WORK, TAKE CARE OF THINGS AT HOME, OR GET ALONG WITH OTHER PEOPLE: SOMEWHAT DIFFICULT
SUM OF ALL RESPONSES TO PHQ9 QUESTIONS 1 AND 2: 4
7. TROUBLE CONCENTRATING ON THINGS, SUCH AS READING THE NEWSPAPER OR WATCHING TELEVISION: MORE THAN HALF THE DAYS
SUM OF ALL RESPONSES TO PHQ QUESTIONS 1-9: 14
9. THOUGHTS THAT YOU WOULD BE BETTER OFF DEAD, OR OF HURTING YOURSELF: NOT AT ALL
8. MOVING OR SPEAKING SO SLOWLY THAT OTHER PEOPLE COULD HAVE NOTICED. OR THE OPPOSITE, BEING SO FIGETY OR RESTLESS THAT YOU HAVE BEEN MOVING AROUND A LOT MORE THAN USUAL: SEVERAL DAYS

## 2023-05-30 ASSESSMENT — PAIN SCALES - GENERAL: PAINLEVEL: 3

## 2023-05-30 NOTE — PROGRESS NOTES
PROGRESS NOTE    Subjective   Chief complaint: Maycol Christiansen is a 77 y.o. male who is an acute skilled patient being seen and evaluated for weakness    HPI:  Patient with ALZ/Dementia working in therapy. He is doing well and is ip walking several feet with walker and CGA to SB. No new concerns at this time. No acute distress.      Objective   Vital signs: 129/76, 98%    Physical Exam  Constitutional:       General: He is not in acute distress.  Eyes:      Extraocular Movements: Extraocular movements intact.   Cardiovascular:      Rate and Rhythm: Normal rate and regular rhythm.   Pulmonary:      Effort: Pulmonary effort is normal.      Breath sounds: Normal breath sounds.   Abdominal:      General: Bowel sounds are normal.      Palpations: Abdomen is soft.   Musculoskeletal:         General: Normal range of motion.      Cervical back: Normal range of motion and neck supple.      Right lower leg: Edema present.      Left lower leg: Edema present.   Neurological:      Mental Status: He is alert.   Psychiatric:         Mood and Affect: Mood normal.         Behavior: Behavior is cooperative.         Assessment/Plan   Problem List Items Addressed This Visit          Nervous    Moderate late onset Alzheimer's dementia (CMS/HCC)     Mentation at baseline  ST for cognition                Other    Weakness     Continue therapy          Medications, treatments, and labs reviewed  Continue medications and treatments as listed in PCC    Agnieszka Peguero MD    1. Weakness        2. Moderate late onset Alzheimer's dementia, unspecified whether behavioral, psychotic, or mood disturbance or anxiety (CMS/HCC)             Scribe Attestation  By signing my name below, Farzaneh BROWNE, Irving   attest that this documentation has been prepared under the direction and in the presence of Agnieszka Peguero MD.    Provider Attestation - Scribe documentation  All medical record entries made by the Scribe were at my direction and personally  dictated by me. I have reviewed the chart and agree that the record accurately reflects my personal performance of the history, physical exam, discussion and plan.

## 2023-05-30 NOTE — PROGRESS NOTES
Patient is here for a hospital f/up has paperwork. Wife is accompanying him with concerns, and medication list.   Right foot pain, drops when walking with big toe crossover  Cold all the time,   Different provider stopped metoprolol and changed to carvedilol has concerns why

## 2023-05-30 NOTE — PROGRESS NOTES
"Subjective   Patient ID: Maycol Christiansen is a 77 y.o. male who presents for Hospital Follow-up.  In for follow up for assistance with AMBULATION.        Review of Systems   All other systems reviewed and are negative.      Objective   Physical Exam  Constitutional:       Appearance: Normal appearance.   HENT:      Head: Normocephalic and atraumatic.      Nose: Nose normal.   Cardiovascular:      Rate and Rhythm: Normal rate and regular rhythm.   Abdominal:      General: Abdomen is flat.      Palpations: Abdomen is soft.   Musculoskeletal:         General: Normal range of motion.      Cervical back: Normal range of motion.   Skin:     General: Skin is warm and dry.   Neurological:      Mental Status: He is alert.       /74 (BP Location: Left arm)   Pulse 61   Temp 36.8 °C (98.2 °F)   Resp 16   Ht 1.765 m (5' 9.5\")   Wt 67.1 kg (148 lb)   SpO2 99%   BMI 21.54 kg/m²         Assessment/Plan   Problem List Items Addressed This Visit          Musculoskeletal    Right foot drop    Relevant Orders    Referral to Podiatry    [employer]       Other    Ambulates with cane    Relevant Orders    Referral to Physical Therapy     Physical Therapy ordered. As well as Ortho and Orthotic. Maycol will follow up in one month,    "

## 2023-05-31 ENCOUNTER — TELEPHONE (OUTPATIENT)
Dept: PRIMARY CARE | Facility: CLINIC | Age: 78
End: 2023-05-31
Payer: MEDICARE

## 2023-05-31 NOTE — TELEPHONE ENCOUNTER
PT for the next 3 weeks [facility] will be giving him PT/OT in the home 1 time for 3wks 3 x a week for two ect.... verbal will faxover for sig

## 2023-06-15 DIAGNOSIS — E78.2 MIXED HYPERLIPIDEMIA: Primary | ICD-10-CM

## 2023-06-15 RX ORDER — ROSUVASTATIN CALCIUM 5 MG/1
5 TABLET, COATED ORAL DAILY
Qty: 90 TABLET | Refills: 1 | Status: SHIPPED | OUTPATIENT
Start: 2023-06-15 | End: 2023-06-23 | Stop reason: SDUPTHER

## 2023-06-15 RX ORDER — ROSUVASTATIN CALCIUM 5 MG/1
5 TABLET, COATED ORAL DAILY
COMMUNITY
End: 2023-06-15 | Stop reason: SDUPTHER

## 2023-06-21 RX ORDER — LEVOTHYROXINE SODIUM 150 UG/1
150 TABLET ORAL DAILY
COMMUNITY
Start: 2023-06-12 | End: 2023-09-07 | Stop reason: SDUPTHER

## 2023-06-23 ENCOUNTER — OFFICE VISIT (OUTPATIENT)
Dept: PRIMARY CARE | Facility: CLINIC | Age: 78
End: 2023-06-23
Payer: MEDICARE

## 2023-06-23 VITALS
HEIGHT: 70 IN | BODY MASS INDEX: 20.9 KG/M2 | TEMPERATURE: 98 F | HEART RATE: 54 BPM | SYSTOLIC BLOOD PRESSURE: 126 MMHG | OXYGEN SATURATION: 96 % | RESPIRATION RATE: 16 BRPM | DIASTOLIC BLOOD PRESSURE: 70 MMHG | WEIGHT: 146 LBS

## 2023-06-23 DIAGNOSIS — G56.02 CARPAL TUNNEL SYNDROME OF LEFT WRIST: Primary | ICD-10-CM

## 2023-06-23 DIAGNOSIS — E78.2 MIXED HYPERLIPIDEMIA: ICD-10-CM

## 2023-06-23 PROCEDURE — 3078F DIAST BP <80 MM HG: CPT | Performed by: INTERNAL MEDICINE

## 2023-06-23 PROCEDURE — 3074F SYST BP LT 130 MM HG: CPT | Performed by: INTERNAL MEDICINE

## 2023-06-23 PROCEDURE — 1036F TOBACCO NON-USER: CPT | Performed by: INTERNAL MEDICINE

## 2023-06-23 PROCEDURE — 1159F MED LIST DOCD IN RCRD: CPT | Performed by: INTERNAL MEDICINE

## 2023-06-23 PROCEDURE — 1157F ADVNC CARE PLAN IN RCRD: CPT | Performed by: INTERNAL MEDICINE

## 2023-06-23 PROCEDURE — 99213 OFFICE O/P EST LOW 20 MIN: CPT | Performed by: INTERNAL MEDICINE

## 2023-06-23 RX ORDER — MIRTAZAPINE 30 MG/1
30 TABLET, FILM COATED ORAL NIGHTLY
COMMUNITY

## 2023-06-23 RX ORDER — ROSUVASTATIN CALCIUM 5 MG/1
5 TABLET, COATED ORAL DAILY
Qty: 90 TABLET | Refills: 1 | Status: SHIPPED | OUTPATIENT
Start: 2023-06-23 | End: 2024-03-28 | Stop reason: SDUPTHER

## 2023-06-23 RX ORDER — CALCITRIOL 0.25 UG/1
0.25 CAPSULE ORAL DAILY
Qty: 90 CAPSULE | Refills: 0 | Status: SHIPPED | OUTPATIENT
Start: 2023-06-23 | End: 2023-09-11 | Stop reason: SDUPTHER

## 2023-06-23 RX ORDER — CLOPIDOGREL BISULFATE 75 MG/1
75 TABLET ORAL DAILY
COMMUNITY
End: 2023-06-23 | Stop reason: SDUPTHER

## 2023-06-23 ASSESSMENT — PAIN SCALES - GENERAL: PAINLEVEL: 0-NO PAIN

## 2023-06-23 NOTE — PROGRESS NOTES
Patient is here for 1 month f/up   Needs Rx refill for calcitriol and rosuvastatin   No Pain today

## 2023-06-23 NOTE — PROGRESS NOTES
"Subjective   Patient ID: Maycol Christiansen is a 77 y.o. male who presents for Follow-up and Med Refill.  In for right wrist numbness    Med Refill        Review of Systems   All other systems reviewed and are negative.      Objective   Physical Exam  Constitutional:       Appearance: Normal appearance.   HENT:      Head: Normocephalic and atraumatic.      Nose: Nose normal.   Cardiovascular:      Rate and Rhythm: Normal rate and regular rhythm.   Abdominal:      General: Abdomen is flat.      Palpations: Abdomen is soft.   Musculoskeletal:         General: Normal range of motion.      Cervical back: Normal range of motion.   Skin:     General: Skin is warm and dry.   Neurological:      Mental Status: He is alert.       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 Will advise Wrist splint     "

## 2023-06-27 ENCOUNTER — APPOINTMENT (OUTPATIENT)
Dept: PRIMARY CARE | Facility: CLINIC | Age: 78
End: 2023-06-27
Payer: MEDICARE

## 2023-08-21 DIAGNOSIS — Z99.89 WALKER AS AMBULATION AID: ICD-10-CM

## 2023-08-23 PROBLEM — S90.829A BLISTER OF FOOT: Status: ACTIVE | Noted: 2023-08-23

## 2023-08-23 PROBLEM — R20.2 PARESTHESIAS: Status: ACTIVE | Noted: 2023-08-23

## 2023-08-23 PROBLEM — N50.82 SCROTAL PAIN: Status: ACTIVE | Noted: 2023-08-23

## 2023-08-23 PROBLEM — N40.0 BENIGN PROSTATIC HYPERPLASIA: Status: ACTIVE | Noted: 2023-08-23

## 2023-08-23 PROBLEM — N40.1 ENLARGED PROSTATE WITH LOWER URINARY TRACT SYMPTOMS (LUTS): Status: ACTIVE | Noted: 2023-08-23

## 2023-08-23 PROBLEM — L90.5 SCAR CONDITION AND FIBROSIS OF SKIN: Status: ACTIVE | Noted: 2023-07-13

## 2023-08-23 PROBLEM — F32.9 MAJOR DEPRESSIVE DISORDER: Status: ACTIVE | Noted: 2023-08-23

## 2023-08-23 PROBLEM — N32.81 OVERACTIVE BLADDER: Status: ACTIVE | Noted: 2023-08-23

## 2023-08-23 PROBLEM — G89.0 THALAMIC PAIN SYNDROME: Status: ACTIVE | Noted: 2023-08-23

## 2023-08-23 PROBLEM — K57.32 DIVERTICULITIS OF COLON: Status: ACTIVE | Noted: 2023-08-23

## 2023-08-23 PROBLEM — M95.5: Status: ACTIVE | Noted: 2023-08-23

## 2023-08-23 PROBLEM — M70.70 BURSITIS, HIP: Status: ACTIVE | Noted: 2023-08-23

## 2023-08-23 PROBLEM — R13.10 ODYNOPHAGIA: Status: ACTIVE | Noted: 2023-08-23

## 2023-08-23 PROBLEM — R42 VERTIGO: Status: ACTIVE | Noted: 2023-08-23

## 2023-08-23 PROBLEM — I10 BENIGN ESSENTIAL HYPERTENSION: Status: ACTIVE | Noted: 2023-08-23

## 2023-08-23 PROBLEM — M43.10 ACQUIRED SPONDYLOLISTHESIS: Status: ACTIVE | Noted: 2023-08-23

## 2023-08-23 PROBLEM — E86.0 DEHYDRATION: Status: ACTIVE | Noted: 2023-08-23

## 2023-08-23 PROBLEM — Z86.0100 HISTORY OF COLON POLYPS: Status: ACTIVE | Noted: 2023-08-23

## 2023-08-23 PROBLEM — R74.01 TRANSAMINITIS: Status: ACTIVE | Noted: 2023-08-23

## 2023-08-23 PROBLEM — R12 HEARTBURN: Status: ACTIVE | Noted: 2023-08-23

## 2023-08-23 PROBLEM — R31.29 HEMATURIA, MICROSCOPIC: Status: ACTIVE | Noted: 2023-08-23

## 2023-08-23 PROBLEM — M17.12 OSTEOARTHROSIS, LOCALIZED, PRIMARY, KNEE, LEFT: Status: ACTIVE | Noted: 2023-08-23

## 2023-08-23 PROBLEM — M76.899 HAMSTRING TENDINITIS: Status: ACTIVE | Noted: 2023-08-23

## 2023-08-23 PROBLEM — K58.2 IRRITABLE BOWEL SYNDROME WITH BOTH CONSTIPATION AND DIARRHEA: Status: ACTIVE | Noted: 2023-08-23

## 2023-08-23 PROBLEM — M79.642 PAIN IN BOTH HANDS: Status: ACTIVE | Noted: 2023-08-23

## 2023-08-23 PROBLEM — D64.9 ANEMIA: Status: ACTIVE | Noted: 2023-08-23

## 2023-08-23 PROBLEM — S90.30XA FOOT CONTUSION: Status: ACTIVE | Noted: 2023-08-23

## 2023-08-23 PROBLEM — K52.9 CHRONIC DIARRHEA: Status: ACTIVE | Noted: 2023-08-23

## 2023-08-23 PROBLEM — G47.00 INSOMNIA: Status: ACTIVE | Noted: 2023-08-23

## 2023-08-23 PROBLEM — J02.9 PHARYNGITIS: Status: ACTIVE | Noted: 2023-08-23

## 2023-08-23 PROBLEM — D04.5 CARCINOMA IN SITU OF SKIN OF TRUNK: Status: ACTIVE | Noted: 2023-07-13

## 2023-08-23 PROBLEM — I95.1 ORTHOSTATIC HYPOTENSION: Status: ACTIVE | Noted: 2023-08-23

## 2023-08-23 PROBLEM — R01.1 MURMUR: Status: ACTIVE | Noted: 2023-08-23

## 2023-08-23 PROBLEM — E55.9 VITAMIN D DEFICIENCY: Status: ACTIVE | Noted: 2023-08-23

## 2023-08-23 PROBLEM — N50.819 PERSISTENT PAIN IN TESTICLE: Status: ACTIVE | Noted: 2023-08-23

## 2023-08-23 PROBLEM — Z96.652 HISTORY OF TOTAL LEFT KNEE REPLACEMENT: Status: ACTIVE | Noted: 2023-08-23

## 2023-08-23 PROBLEM — S39.013A: Status: ACTIVE | Noted: 2023-08-23

## 2023-08-23 PROBLEM — R20.0 NUMBNESS OF RIGHT FOOT: Status: ACTIVE | Noted: 2023-08-23

## 2023-08-23 PROBLEM — D13.1 FUNDIC GLAND POLYPS OF STOMACH, BENIGN: Status: ACTIVE | Noted: 2023-08-23

## 2023-08-23 PROBLEM — M62.81 MUSCLE WEAKNESS (GENERALIZED): Status: ACTIVE | Noted: 2023-08-23

## 2023-08-23 PROBLEM — G93.40 ENCEPHALOPATHY: Status: ACTIVE | Noted: 2023-08-23

## 2023-08-23 PROBLEM — R35.0 URINARY FREQUENCY: Status: ACTIVE | Noted: 2023-08-23

## 2023-08-23 PROBLEM — L82.1 SEBORRHEIC KERATOSIS: Status: ACTIVE | Noted: 2023-07-13

## 2023-08-23 PROBLEM — C44.612 BASAL CELL CARCINOMA OF SKIN OF RIGHT UPPER LIMB, INCLUDING SHOULDER: Status: ACTIVE | Noted: 2023-07-13

## 2023-08-23 PROBLEM — Z85.828 PERSONAL HISTORY OF OTHER MALIGNANT NEOPLASM OF SKIN: Status: ACTIVE | Noted: 2023-07-13

## 2023-08-23 PROBLEM — R45.1 RESTLESSNESS AND AGITATION: Status: ACTIVE | Noted: 2023-08-23

## 2023-08-23 PROBLEM — S90.819A FOOT ABRASION: Status: ACTIVE | Noted: 2023-08-23

## 2023-08-23 PROBLEM — K21.9 GASTROESOPHAGEAL REFLUX DISEASE: Status: ACTIVE | Noted: 2023-08-23

## 2023-08-23 PROBLEM — M06.9 RHEUMATOID ARTHRITIS INVOLVING MULTIPLE SITES (MULTI): Status: ACTIVE | Noted: 2023-06-16

## 2023-08-23 PROBLEM — R14.0 BLOATING: Status: ACTIVE | Noted: 2023-08-23

## 2023-08-23 PROBLEM — J06.9 ACUTE URI: Status: ACTIVE | Noted: 2023-08-23

## 2023-08-23 PROBLEM — R79.89 ELEVATED SERUM CREATININE: Status: ACTIVE | Noted: 2023-08-23

## 2023-08-23 PROBLEM — R14.3 FLATULENCE: Status: ACTIVE | Noted: 2023-08-23

## 2023-08-23 PROBLEM — K59.00 CONSTIPATION: Status: ACTIVE | Noted: 2023-08-23

## 2023-08-23 PROBLEM — F41.9 ANXIETY: Status: ACTIVE | Noted: 2023-08-23

## 2023-08-23 PROBLEM — I25.10 CAD (CORONARY ATHEROSCLEROTIC DISEASE): Status: ACTIVE | Noted: 2023-08-23

## 2023-08-23 PROBLEM — N52.9 MALE ERECTILE DISORDER OF ORGANIC ORIGIN: Status: ACTIVE | Noted: 2023-08-23

## 2023-08-23 PROBLEM — Z86.010 HISTORY OF COLON POLYPS: Status: ACTIVE | Noted: 2023-08-23

## 2023-08-23 PROBLEM — A04.9 BACTERIAL COLITIS: Status: ACTIVE | Noted: 2023-08-23

## 2023-08-23 PROBLEM — F32.A DEPRESSION: Status: ACTIVE | Noted: 2023-08-23

## 2023-08-23 PROBLEM — R41.82 ALTERED MENTAL STATUS: Status: ACTIVE | Noted: 2023-08-23

## 2023-08-23 PROBLEM — Z86.73 HISTORY OF CVA (CEREBROVASCULAR ACCIDENT): Status: ACTIVE | Noted: 2023-08-23

## 2023-08-23 PROBLEM — R39.15 URINARY URGENCY: Status: ACTIVE | Noted: 2023-08-23

## 2023-08-23 PROBLEM — R79.89 ELEVATED LFTS: Status: ACTIVE | Noted: 2023-08-23

## 2023-08-23 PROBLEM — F41.1 GENERALIZED ANXIETY DISORDER: Status: ACTIVE | Noted: 2023-08-23

## 2023-08-23 PROBLEM — R19.8 IRREGULAR BOWEL HABITS: Status: ACTIVE | Noted: 2023-08-23

## 2023-08-23 PROBLEM — R26.2 DIFFICULTY WALKING: Status: ACTIVE | Noted: 2023-08-23

## 2023-08-23 PROBLEM — F43.22 ADJUSTMENT DISORDER WITH ANXIOUS MOOD: Status: ACTIVE | Noted: 2023-08-23

## 2023-08-23 PROBLEM — M79.641 PAIN IN BOTH HANDS: Status: ACTIVE | Noted: 2023-08-23

## 2023-08-23 PROBLEM — M79.672 LEFT FOOT PAIN: Status: ACTIVE | Noted: 2023-08-23

## 2023-08-23 PROBLEM — L91.8 OTHER HYPERTROPHIC DISORDERS OF THE SKIN: Status: ACTIVE | Noted: 2023-07-13

## 2023-08-23 PROBLEM — R27.8 DECREASED COORDINATION: Status: ACTIVE | Noted: 2023-08-23

## 2023-08-23 PROBLEM — N50.811 PAIN IN RIGHT TESTICLE: Status: ACTIVE | Noted: 2023-08-23

## 2023-08-23 PROBLEM — R14.2 BELCHING: Status: ACTIVE | Noted: 2023-08-23

## 2023-08-23 PROBLEM — R63.4 WEIGHT LOSS: Status: ACTIVE | Noted: 2023-08-23

## 2023-08-23 PROBLEM — R09.89 CHEST CONGESTION: Status: ACTIVE | Noted: 2023-08-23

## 2023-08-23 PROBLEM — M25.561 KNEE PAIN, RIGHT: Status: ACTIVE | Noted: 2023-08-23

## 2023-08-23 PROBLEM — M65.30 TRIGGER FINGER, LEFT: Status: ACTIVE | Noted: 2023-08-23

## 2023-08-23 PROBLEM — D12.6 COLONIC ADENOMA: Status: ACTIVE | Noted: 2023-08-23

## 2023-08-23 PROBLEM — M25.662 STIFFNESS OF LEFT KNEE: Status: ACTIVE | Noted: 2023-08-23

## 2023-08-23 PROBLEM — F32.0 MILD MAJOR DEPRESSION, SINGLE EPISODE (CMS-HCC): Status: ACTIVE | Noted: 2023-08-23

## 2023-08-23 PROBLEM — G62.9 NEUROPATHY: Status: ACTIVE | Noted: 2023-08-23

## 2023-08-23 PROBLEM — R26.81 UNSTEADY GAIT: Status: ACTIVE | Noted: 2023-08-23

## 2023-08-23 PROBLEM — R29.898 POOR FINE MOTOR SKILLS: Status: ACTIVE | Noted: 2023-08-23

## 2023-08-23 PROBLEM — L57.0 ACTINIC KERATOSIS: Status: ACTIVE | Noted: 2023-07-13

## 2023-08-23 PROBLEM — M17.9 OSTEOARTHRITIS OF KNEE: Status: ACTIVE | Noted: 2023-08-23

## 2023-08-23 PROBLEM — R20.9 SENSORY DISTURBANCE: Status: ACTIVE | Noted: 2023-08-23

## 2023-08-23 PROBLEM — K64.8 INTERNAL HEMORRHOIDS: Status: ACTIVE | Noted: 2023-08-23

## 2023-08-23 PROBLEM — E05.90 HYPERTHYROIDISM: Status: ACTIVE | Noted: 2023-08-23

## 2023-08-23 PROBLEM — E03.9 HYPOTHYROIDISM: Status: ACTIVE | Noted: 2023-08-23

## 2023-08-23 PROBLEM — D48.5 NEOPLASM OF UNCERTAIN BEHAVIOR OF SKIN: Status: ACTIVE | Noted: 2023-07-13

## 2023-08-23 PROBLEM — R10.11 RUQ PAIN: Status: ACTIVE | Noted: 2023-08-23

## 2023-08-23 PROBLEM — M96.1 POSTLAMINECTOMY SYNDROME: Status: ACTIVE | Noted: 2023-08-23

## 2023-08-23 PROBLEM — N25.81 HYPERPARATHYROIDISM, SECONDARY (MULTI): Status: ACTIVE | Noted: 2023-08-23

## 2023-08-23 PROBLEM — M17.11 PRIMARY OSTEOARTHRITIS OF RIGHT KNEE: Status: ACTIVE | Noted: 2023-08-23

## 2023-08-23 PROBLEM — M67.40 GANGLION CYST: Status: ACTIVE | Noted: 2023-08-23

## 2023-08-23 PROBLEM — I71.20 ANEURYSM OF THORACIC AORTA (CMS-HCC): Status: ACTIVE | Noted: 2023-08-23

## 2023-08-23 PROBLEM — M54.16 LUMBAR RADICULITIS: Status: ACTIVE | Noted: 2023-08-23

## 2023-08-23 RX ORDER — POLYETHYLENE GLYCOL 3350 17 G/17G
17 POWDER, FOR SOLUTION ORAL DAILY
COMMUNITY
End: 2024-03-28 | Stop reason: ENTERED-IN-ERROR

## 2023-08-23 RX ORDER — DEXAMETHASONE 6 MG/1
6 TABLET ORAL
COMMUNITY
End: 2024-03-28 | Stop reason: ENTERED-IN-ERROR

## 2023-08-23 RX ORDER — OLANZAPINE 5 MG/1
5 TABLET, ORALLY DISINTEGRATING ORAL
COMMUNITY
End: 2024-03-28 | Stop reason: ENTERED-IN-ERROR

## 2023-08-23 RX ORDER — HYDROXYZINE PAMOATE 25 MG/1
25 CAPSULE ORAL
COMMUNITY
End: 2024-03-28 | Stop reason: ENTERED-IN-ERROR

## 2023-08-23 RX ORDER — ATORVASTATIN CALCIUM 10 MG/1
10 TABLET, FILM COATED ORAL NIGHTLY PRN
COMMUNITY
End: 2024-03-28 | Stop reason: ENTERED-IN-ERROR

## 2023-09-05 ENCOUNTER — OFFICE VISIT (OUTPATIENT)
Dept: PRIMARY CARE | Facility: CLINIC | Age: 78
End: 2023-09-05
Payer: MEDICARE

## 2023-09-05 VITALS
TEMPERATURE: 98.2 F | SYSTOLIC BLOOD PRESSURE: 118 MMHG | RESPIRATION RATE: 16 BRPM | DIASTOLIC BLOOD PRESSURE: 74 MMHG | OXYGEN SATURATION: 98 % | HEART RATE: 57 BPM | HEIGHT: 70 IN | WEIGHT: 145.8 LBS | BODY MASS INDEX: 20.87 KG/M2

## 2023-09-05 DIAGNOSIS — F02.B0 MODERATE LATE ONSET ALZHEIMER'S DEMENTIA, UNSPECIFIED WHETHER BEHAVIORAL, PSYCHOTIC, OR MOOD DISTURBANCE OR ANXIETY (MULTI): Primary | ICD-10-CM

## 2023-09-05 DIAGNOSIS — G30.1 MODERATE LATE ONSET ALZHEIMER'S DEMENTIA, UNSPECIFIED WHETHER BEHAVIORAL, PSYCHOTIC, OR MOOD DISTURBANCE OR ANXIETY (MULTI): Primary | ICD-10-CM

## 2023-09-05 PROCEDURE — 99213 OFFICE O/P EST LOW 20 MIN: CPT | Performed by: INTERNAL MEDICINE

## 2023-09-05 PROCEDURE — 3074F SYST BP LT 130 MM HG: CPT | Performed by: INTERNAL MEDICINE

## 2023-09-05 PROCEDURE — 1036F TOBACCO NON-USER: CPT | Performed by: INTERNAL MEDICINE

## 2023-09-05 PROCEDURE — 3078F DIAST BP <80 MM HG: CPT | Performed by: INTERNAL MEDICINE

## 2023-09-05 PROCEDURE — 1125F AMNT PAIN NOTED PAIN PRSNT: CPT | Performed by: INTERNAL MEDICINE

## 2023-09-05 PROCEDURE — 90662 IIV NO PRSV INCREASED AG IM: CPT | Performed by: INTERNAL MEDICINE

## 2023-09-05 PROCEDURE — G0009 ADMIN PNEUMOCOCCAL VACCINE: HCPCS | Performed by: INTERNAL MEDICINE

## 2023-09-05 PROCEDURE — 90677 PCV20 VACCINE IM: CPT | Performed by: INTERNAL MEDICINE

## 2023-09-05 PROCEDURE — 1159F MED LIST DOCD IN RCRD: CPT | Performed by: INTERNAL MEDICINE

## 2023-09-05 PROCEDURE — G0008 ADMIN INFLUENZA VIRUS VAC: HCPCS | Performed by: INTERNAL MEDICINE

## 2023-09-05 PROCEDURE — 1157F ADVNC CARE PLAN IN RCRD: CPT | Performed by: INTERNAL MEDICINE

## 2023-09-05 ASSESSMENT — PATIENT HEALTH QUESTIONNAIRE - PHQ9
9. THOUGHTS THAT YOU WOULD BE BETTER OFF DEAD, OR OF HURTING YOURSELF: NOT AT ALL
SUM OF ALL RESPONSES TO PHQ9 QUESTIONS 1 AND 2: 2
7. TROUBLE CONCENTRATING ON THINGS, SUCH AS READING THE NEWSPAPER OR WATCHING TELEVISION: SEVERAL DAYS
5. POOR APPETITE OR OVEREATING: NOT AT ALL
4. FEELING TIRED OR HAVING LITTLE ENERGY: SEVERAL DAYS
2. FEELING DOWN, DEPRESSED OR HOPELESS: SEVERAL DAYS
8. MOVING OR SPEAKING SO SLOWLY THAT OTHER PEOPLE COULD HAVE NOTICED. OR THE OPPOSITE, BEING SO FIGETY OR RESTLESS THAT YOU HAVE BEEN MOVING AROUND A LOT MORE THAN USUAL: NOT AT ALL
6. FEELING BAD ABOUT YOURSELF - OR THAT YOU ARE A FAILURE OR HAVE LET YOURSELF OR YOUR FAMILY DOWN: SEVERAL DAYS
SUM OF ALL RESPONSES TO PHQ QUESTIONS 1-9: 6
3. TROUBLE FALLING OR STAYING ASLEEP OR SLEEPING TOO MUCH: SEVERAL DAYS
1. LITTLE INTEREST OR PLEASURE IN DOING THINGS: SEVERAL DAYS
10. IF YOU CHECKED OFF ANY PROBLEMS, HOW DIFFICULT HAVE THESE PROBLEMS MADE IT FOR YOU TO DO YOUR WORK, TAKE CARE OF THINGS AT HOME, OR GET ALONG WITH OTHER PEOPLE: SOMEWHAT DIFFICULT

## 2023-09-05 ASSESSMENT — ANXIETY QUESTIONNAIRES
GAD7 TOTAL SCORE: 16
7. FEELING AFRAID AS IF SOMETHING AWFUL MIGHT HAPPEN: SEVERAL DAYS
6. BECOMING EASILY ANNOYED OR IRRITABLE: MORE THAN HALF THE DAYS
2. NOT BEING ABLE TO STOP OR CONTROL WORRYING: NEARLY EVERY DAY
IF YOU CHECKED OFF ANY PROBLEMS ON THIS QUESTIONNAIRE, HOW DIFFICULT HAVE THESE PROBLEMS MADE IT FOR YOU TO DO YOUR WORK, TAKE CARE OF THINGS AT HOME, OR GET ALONG WITH OTHER PEOPLE: SOMEWHAT DIFFICULT
3. WORRYING TOO MUCH ABOUT DIFFERENT THINGS: NEARLY EVERY DAY
5. BEING SO RESTLESS THAT IT IS HARD TO SIT STILL: MORE THAN HALF THE DAYS
4. TROUBLE RELAXING: NEARLY EVERY DAY
1. FEELING NERVOUS, ANXIOUS, OR ON EDGE: MORE THAN HALF THE DAYS

## 2023-09-05 ASSESSMENT — PAIN SCALES - GENERAL: PAINLEVEL: 8

## 2023-09-05 NOTE — PROGRESS NOTES
Patient is here for a 2-3 month f/up , no concerns to discuss   Would like Pneumococcal and High-dose flu vaccines today   Had a fall last night 9/4/2023   No Rx refills   Right foot pain , 8-10

## 2023-09-07 DIAGNOSIS — E03.9 ACQUIRED HYPOTHYROIDISM: Primary | ICD-10-CM

## 2023-09-07 NOTE — TELEPHONE ENCOUNTER
Patients Pharmacy faxed over Rx refill request      Umu Barahona RN  [facility]

## 2023-09-08 RX ORDER — LEVOTHYROXINE SODIUM 150 UG/1
150 TABLET ORAL DAILY
Qty: 90 TABLET | Refills: 2 | Status: SHIPPED | OUTPATIENT
Start: 2023-09-08 | End: 2024-03-28 | Stop reason: SDUPTHER

## 2023-09-08 NOTE — PROGRESS NOTES
"Subjective   Patient ID: Maycol Christiansen is a 77 y.o. male who presents for Follow-up (2-3 months) and Flu Vaccine.  In for follow and medication reveiw        Review of Systems   All other systems reviewed and are negative.      Objective   Physical Exam  Constitutional:       Appearance: Normal appearance.   HENT:      Head: Normocephalic and atraumatic.      Nose: Nose normal.   Cardiovascular:      Rate and Rhythm: Normal rate and regular rhythm.   Abdominal:      General: Abdomen is flat.      Palpations: Abdomen is soft.   Musculoskeletal:         General: Normal range of motion.      Cervical back: Normal range of motion.   Skin:     General: Skin is warm and dry.   Neurological:      Mental Status: He is alert.       /74 (BP Location: Left arm)   Pulse 57   Temp 36.8 °C (98.2 °F)   Resp 16   Ht 1.765 m (5' 9.5\")   Wt 66.1 kg (145 lb 12.8 oz)   SpO2 98%   BMI 21.22 kg/m²         Assessment/Plan   Problem List Items Addressed This Visit    None         "

## 2023-09-11 DIAGNOSIS — E78.2 MIXED HYPERLIPIDEMIA: ICD-10-CM

## 2023-09-12 RX ORDER — CALCITRIOL 0.25 UG/1
0.25 CAPSULE ORAL DAILY
Qty: 90 CAPSULE | Refills: 1 | Status: SHIPPED | OUTPATIENT
Start: 2023-09-12 | End: 2024-02-05 | Stop reason: SDUPTHER

## 2023-12-15 ENCOUNTER — TELEPHONE (OUTPATIENT)
Dept: PRIMARY CARE | Facility: CLINIC | Age: 78
End: 2023-12-15
Payer: MEDICARE

## 2023-12-15 DIAGNOSIS — I1A.0 RESISTANT HYPERTENSION: Primary | ICD-10-CM

## 2023-12-15 RX ORDER — METOPROLOL SUCCINATE 25 MG/1
25 TABLET, EXTENDED RELEASE ORAL DAILY
Qty: 90 TABLET | Refills: 3 | Status: SHIPPED | OUTPATIENT
Start: 2023-12-15

## 2024-01-03 ENCOUNTER — TELEPHONE (OUTPATIENT)
Dept: PRIMARY CARE | Facility: CLINIC | Age: 79
End: 2024-01-03
Payer: MEDICARE

## 2024-01-03 NOTE — TELEPHONE ENCOUNTER
VM:  Pt vomiting 5-6 x last night.  No fever.  Pt asked if he should have his Rx's.    Conferred w AM, then advised to pt:  yes, take his meds, keep pt hydrated w water and Gatorade, go to UC if he gets worse.

## 2024-02-05 DIAGNOSIS — E78.2 MIXED HYPERLIPIDEMIA: ICD-10-CM

## 2024-02-06 RX ORDER — CALCITRIOL 0.25 UG/1
0.25 CAPSULE ORAL DAILY
Qty: 90 CAPSULE | Refills: 1 | Status: ON HOLD | OUTPATIENT
Start: 2024-02-06 | End: 2024-03-29 | Stop reason: SDUPTHER

## 2024-02-09 ENCOUNTER — TELEPHONE (OUTPATIENT)
Dept: PRIMARY CARE | Facility: CLINIC | Age: 79
End: 2024-02-09
Payer: MEDICARE

## 2024-02-09 NOTE — TELEPHONE ENCOUNTER
Spoke w/ patient's wife informed per [facility] imodium if 4 x per day, BRAT diet, plenty of fluids, next step stool sample, AM

## 2024-02-09 NOTE — TELEPHONE ENCOUNTER
Patients' wife called in he has diarrhea (very soft) for three days in the past week, low grade fever for 2 day, is eating but when he has this she cuts him back to toast, Advise?

## 2024-03-11 DIAGNOSIS — F41.9 ANXIETY: Primary | ICD-10-CM

## 2024-03-11 NOTE — TELEPHONE ENCOUNTER
Dr. Vargas, psych, every 3mo, renews alprazolam 1mg 1/day [facility] [phone]  Pt is out of meds tomorrow night.  Possible to send a 2wk bridge until 3/26 when Dr. Vargas returns?

## 2024-03-12 RX ORDER — ALPRAZOLAM 1 MG/1
1 TABLET ORAL NIGHTLY
Qty: 14 TABLET | Refills: 0 | Status: SHIPPED | OUTPATIENT
Start: 2024-03-12

## 2024-03-28 ENCOUNTER — APPOINTMENT (OUTPATIENT)
Dept: RADIOLOGY | Facility: HOSPITAL | Age: 79
End: 2024-03-28
Payer: MEDICARE

## 2024-03-28 ENCOUNTER — APPOINTMENT (OUTPATIENT)
Dept: CARDIOLOGY | Facility: HOSPITAL | Age: 79
End: 2024-03-28
Payer: MEDICARE

## 2024-03-28 ENCOUNTER — HOSPITAL ENCOUNTER (OUTPATIENT)
Facility: HOSPITAL | Age: 79
Setting detail: OBSERVATION
Discharge: NURSING, REHAB OR LONG-TERM CARE | End: 2024-03-30
Attending: EMERGENCY MEDICINE | Admitting: FAMILY MEDICINE
Payer: MEDICARE

## 2024-03-28 DIAGNOSIS — E78.2 MIXED HYPERLIPIDEMIA: ICD-10-CM

## 2024-03-28 DIAGNOSIS — J10.1 INFLUENZA A: Primary | ICD-10-CM

## 2024-03-28 DIAGNOSIS — E03.9 ACQUIRED HYPOTHYROIDISM: ICD-10-CM

## 2024-03-28 DIAGNOSIS — J11.1 INFLUENZA: ICD-10-CM

## 2024-03-28 PROBLEM — R07.9 CHEST PAIN: Status: ACTIVE | Noted: 2024-03-28

## 2024-03-28 PROBLEM — Z86.39 HISTORY OF HYPOTHYROIDISM: Status: ACTIVE | Noted: 2024-03-28

## 2024-03-28 PROBLEM — Z99.89 DEPENDENCE ON WALKING STICK: Status: ACTIVE | Noted: 2023-05-30

## 2024-03-28 PROBLEM — U07.1 DISEASE DUE TO SEVERE ACUTE RESPIRATORY SYNDROME CORONAVIRUS 2 (SARS-COV-2): Status: ACTIVE | Noted: 2024-03-28

## 2024-03-28 PROBLEM — M54.50 LOW BACK PAIN: Status: ACTIVE | Noted: 2024-03-28

## 2024-03-28 PROBLEM — Z86.79 HISTORY OF CARDIOVASCULAR DISORDER: Status: ACTIVE | Noted: 2024-03-28

## 2024-03-28 PROBLEM — I45.9 CARDIAC CONDUCTION DISORDER: Status: ACTIVE | Noted: 2021-09-27

## 2024-03-28 PROBLEM — N18.9 CHRONIC KIDNEY DISEASE: Status: ACTIVE | Noted: 2023-05-03

## 2024-03-28 LAB
ALBUMIN SERPL BCP-MCNC: 4.2 G/DL (ref 3.4–5)
ALP SERPL-CCNC: 52 U/L (ref 33–136)
ALT SERPL W P-5'-P-CCNC: 18 U/L (ref 10–52)
ANION GAP SERPL CALC-SCNC: 11 MMOL/L (ref 10–20)
APPEARANCE UR: ABNORMAL
AST SERPL W P-5'-P-CCNC: 27 U/L (ref 9–39)
BASOPHILS # BLD AUTO: 0.01 X10*3/UL (ref 0–0.1)
BASOPHILS NFR BLD AUTO: 0.2 %
BILIRUB SERPL-MCNC: 1.6 MG/DL (ref 0–1.2)
BILIRUB UR STRIP.AUTO-MCNC: NEGATIVE MG/DL
BUN SERPL-MCNC: 24 MG/DL (ref 6–23)
CALCIUM SERPL-MCNC: 10 MG/DL (ref 8.6–10.3)
CAOX CRY #/AREA UR COMP ASSIST: ABNORMAL /HPF
CARDIAC TROPONIN I PNL SERPL HS: 25 NG/L (ref 0–20)
CARDIAC TROPONIN I PNL SERPL HS: 30 NG/L (ref 0–20)
CARDIAC TROPONIN I PNL SERPL HS: 71 NG/L (ref 0–20)
CHLORIDE SERPL-SCNC: 104 MMOL/L (ref 98–107)
CO2 SERPL-SCNC: 28 MMOL/L (ref 21–32)
COLOR UR: YELLOW
CREAT SERPL-MCNC: 1.82 MG/DL (ref 0.5–1.3)
EGFRCR SERPLBLD CKD-EPI 2021: 37 ML/MIN/1.73M*2
EOSINOPHIL # BLD AUTO: 0.03 X10*3/UL (ref 0–0.4)
EOSINOPHIL NFR BLD AUTO: 0.5 %
ERYTHROCYTE [DISTWIDTH] IN BLOOD BY AUTOMATED COUNT: 13.5 % (ref 11.5–14.5)
FLUAV RNA RESP QL NAA+PROBE: DETECTED
FLUBV RNA RESP QL NAA+PROBE: NOT DETECTED
GLUCOSE SERPL-MCNC: 115 MG/DL (ref 74–99)
GLUCOSE UR STRIP.AUTO-MCNC: NORMAL MG/DL
HCT VFR BLD AUTO: 33.9 % (ref 41–52)
HGB BLD-MCNC: 10.8 G/DL (ref 13.5–17.5)
HYALINE CASTS #/AREA URNS AUTO: ABNORMAL /LPF
IMM GRANULOCYTES # BLD AUTO: 0.02 X10*3/UL (ref 0–0.5)
IMM GRANULOCYTES NFR BLD AUTO: 0.3 % (ref 0–0.9)
KETONES UR STRIP.AUTO-MCNC: NEGATIVE MG/DL
LACTATE SERPL-SCNC: 1 MMOL/L (ref 0.4–2)
LEUKOCYTE ESTERASE UR QL STRIP.AUTO: NEGATIVE
LYMPHOCYTES # BLD AUTO: 0.38 X10*3/UL (ref 0.8–3)
LYMPHOCYTES NFR BLD AUTO: 6.1 %
MAGNESIUM SERPL-MCNC: 1.7 MG/DL (ref 1.6–2.4)
MCH RBC QN AUTO: 28.3 PG (ref 26–34)
MCHC RBC AUTO-ENTMCNC: 31.9 G/DL (ref 32–36)
MCV RBC AUTO: 89 FL (ref 80–100)
MONOCYTES # BLD AUTO: 0.57 X10*3/UL (ref 0.05–0.8)
MONOCYTES NFR BLD AUTO: 9.1 %
MUCOUS THREADS #/AREA URNS AUTO: ABNORMAL /LPF
NEUTROPHILS # BLD AUTO: 5.23 X10*3/UL (ref 1.6–5.5)
NEUTROPHILS NFR BLD AUTO: 83.8 %
NITRITE UR QL STRIP.AUTO: NEGATIVE
NRBC BLD-RTO: 0 /100 WBCS (ref 0–0)
PH UR STRIP.AUTO: 5.5 [PH]
PLATELET # BLD AUTO: 114 X10*3/UL (ref 150–450)
POTASSIUM SERPL-SCNC: 3.7 MMOL/L (ref 3.5–5.3)
PROT SERPL-MCNC: 6.6 G/DL (ref 6.4–8.2)
PROT UR STRIP.AUTO-MCNC: ABNORMAL MG/DL
RBC # BLD AUTO: 3.82 X10*6/UL (ref 4.5–5.9)
RBC # UR STRIP.AUTO: NEGATIVE /UL
RBC #/AREA URNS AUTO: ABNORMAL /HPF
RSV RNA RESP QL NAA+PROBE: NOT DETECTED
SARS-COV-2 RNA RESP QL NAA+PROBE: NOT DETECTED
SODIUM SERPL-SCNC: 139 MMOL/L (ref 136–145)
SP GR UR STRIP.AUTO: 1.02
UROBILINOGEN UR STRIP.AUTO-MCNC: NORMAL MG/DL
WBC # BLD AUTO: 6.2 X10*3/UL (ref 4.4–11.3)
WBC #/AREA URNS AUTO: ABNORMAL /HPF

## 2024-03-28 PROCEDURE — 85025 COMPLETE CBC W/AUTO DIFF WBC: CPT | Performed by: PHYSICIAN ASSISTANT

## 2024-03-28 PROCEDURE — G0378 HOSPITAL OBSERVATION PER HR: HCPCS

## 2024-03-28 PROCEDURE — 36415 COLL VENOUS BLD VENIPUNCTURE: CPT | Performed by: PHYSICIAN ASSISTANT

## 2024-03-28 PROCEDURE — 84484 ASSAY OF TROPONIN QUANT: CPT | Performed by: NURSE PRACTITIONER

## 2024-03-28 PROCEDURE — 2500000001 HC RX 250 WO HCPCS SELF ADMINISTERED DRUGS (ALT 637 FOR MEDICARE OP): Performed by: NURSE PRACTITIONER

## 2024-03-28 PROCEDURE — 72125 CT NECK SPINE W/O DYE: CPT

## 2024-03-28 PROCEDURE — 81001 URINALYSIS AUTO W/SCOPE: CPT | Performed by: PHYSICIAN ASSISTANT

## 2024-03-28 PROCEDURE — 71045 X-RAY EXAM CHEST 1 VIEW: CPT

## 2024-03-28 PROCEDURE — 2500000002 HC RX 250 W HCPCS SELF ADMINISTERED DRUGS (ALT 637 FOR MEDICARE OP, ALT 636 FOR OP/ED): Performed by: PHYSICIAN ASSISTANT

## 2024-03-28 PROCEDURE — 96372 THER/PROPH/DIAG INJ SC/IM: CPT | Performed by: NURSE PRACTITIONER

## 2024-03-28 PROCEDURE — 93005 ELECTROCARDIOGRAM TRACING: CPT

## 2024-03-28 PROCEDURE — 2500000004 HC RX 250 GENERAL PHARMACY W/ HCPCS (ALT 636 FOR OP/ED): Performed by: PHYSICIAN ASSISTANT

## 2024-03-28 PROCEDURE — 96360 HYDRATION IV INFUSION INIT: CPT

## 2024-03-28 PROCEDURE — 71045 X-RAY EXAM CHEST 1 VIEW: CPT | Mod: FOREIGN READ | Performed by: RADIOLOGY

## 2024-03-28 PROCEDURE — 96361 HYDRATE IV INFUSION ADD-ON: CPT

## 2024-03-28 PROCEDURE — 84484 ASSAY OF TROPONIN QUANT: CPT | Performed by: PHYSICIAN ASSISTANT

## 2024-03-28 PROCEDURE — 2500000004 HC RX 250 GENERAL PHARMACY W/ HCPCS (ALT 636 FOR OP/ED): Performed by: NURSE PRACTITIONER

## 2024-03-28 PROCEDURE — 80053 COMPREHEN METABOLIC PANEL: CPT | Performed by: PHYSICIAN ASSISTANT

## 2024-03-28 PROCEDURE — 72125 CT NECK SPINE W/O DYE: CPT | Performed by: RADIOLOGY

## 2024-03-28 PROCEDURE — 83605 ASSAY OF LACTIC ACID: CPT | Performed by: PHYSICIAN ASSISTANT

## 2024-03-28 PROCEDURE — 99285 EMERGENCY DEPT VISIT HI MDM: CPT | Mod: 25

## 2024-03-28 PROCEDURE — 87637 SARSCOV2&INF A&B&RSV AMP PRB: CPT | Performed by: PHYSICIAN ASSISTANT

## 2024-03-28 PROCEDURE — 70450 CT HEAD/BRAIN W/O DYE: CPT | Performed by: RADIOLOGY

## 2024-03-28 PROCEDURE — 87040 BLOOD CULTURE FOR BACTERIA: CPT | Mod: AHULAB | Performed by: PHYSICIAN ASSISTANT

## 2024-03-28 PROCEDURE — 83735 ASSAY OF MAGNESIUM: CPT | Performed by: PHYSICIAN ASSISTANT

## 2024-03-28 PROCEDURE — 70450 CT HEAD/BRAIN W/O DYE: CPT

## 2024-03-28 RX ORDER — OSELTAMIVIR PHOSPHATE 30 MG/1
30 CAPSULE ORAL EVERY 12 HOURS
Qty: 10 CAPSULE | Refills: 0 | Status: SHIPPED | OUTPATIENT
Start: 2024-03-28 | End: 2024-03-29 | Stop reason: HOSPADM

## 2024-03-28 RX ORDER — LEVOTHYROXINE SODIUM 150 UG/1
150 TABLET ORAL DAILY
Qty: 90 TABLET | Refills: 2 | Status: SHIPPED | OUTPATIENT
Start: 2024-03-28

## 2024-03-28 RX ORDER — OSELTAMIVIR PHOSPHATE 30 MG/1
30 CAPSULE ORAL EVERY 24 HOURS
Status: DISCONTINUED | OUTPATIENT
Start: 2024-03-29 | End: 2024-03-30 | Stop reason: HOSPADM

## 2024-03-28 RX ORDER — CALCITRIOL 0.25 UG/1
0.25 CAPSULE ORAL 2 TIMES DAILY
COMMUNITY

## 2024-03-28 RX ORDER — ONDANSETRON HYDROCHLORIDE 2 MG/ML
4 INJECTION, SOLUTION INTRAVENOUS EVERY 8 HOURS PRN
Status: DISCONTINUED | OUTPATIENT
Start: 2024-03-28 | End: 2024-03-30 | Stop reason: HOSPADM

## 2024-03-28 RX ORDER — ENOXAPARIN SODIUM 100 MG/ML
30 INJECTION SUBCUTANEOUS EVERY 24 HOURS
Status: DISCONTINUED | OUTPATIENT
Start: 2024-03-28 | End: 2024-03-30 | Stop reason: HOSPADM

## 2024-03-28 RX ORDER — SODIUM CHLORIDE 9 MG/ML
75 INJECTION, SOLUTION INTRAVENOUS CONTINUOUS
Status: DISCONTINUED | OUTPATIENT
Start: 2024-03-28 | End: 2024-03-29

## 2024-03-28 RX ORDER — OSELTAMIVIR PHOSPHATE 30 MG/1
30 CAPSULE ORAL ONCE
Status: COMPLETED | OUTPATIENT
Start: 2024-03-28 | End: 2024-03-28

## 2024-03-28 RX ORDER — POLYETHYLENE GLYCOL 3350 17 G/17G
17 POWDER, FOR SOLUTION ORAL DAILY
Status: DISCONTINUED | OUTPATIENT
Start: 2024-03-28 | End: 2024-03-30 | Stop reason: HOSPADM

## 2024-03-28 RX ORDER — ROSUVASTATIN CALCIUM 5 MG/1
5 TABLET, COATED ORAL NIGHTLY
Qty: 90 TABLET | Refills: 1 | Status: SHIPPED | OUTPATIENT
Start: 2024-03-28

## 2024-03-28 RX ORDER — ONDANSETRON 4 MG/1
4 TABLET, FILM COATED ORAL EVERY 8 HOURS PRN
Status: DISCONTINUED | OUTPATIENT
Start: 2024-03-28 | End: 2024-03-30 | Stop reason: HOSPADM

## 2024-03-28 RX ORDER — TALC
3 POWDER (GRAM) TOPICAL NIGHTLY
Status: DISCONTINUED | OUTPATIENT
Start: 2024-03-28 | End: 2024-03-29

## 2024-03-28 RX ADMIN — ENOXAPARIN SODIUM 30 MG: 30 INJECTION SUBCUTANEOUS at 17:10

## 2024-03-28 RX ADMIN — Medication 3 MG: at 21:00

## 2024-03-28 RX ADMIN — SODIUM CHLORIDE 500 ML: 9 INJECTION, SOLUTION INTRAVENOUS at 05:17

## 2024-03-28 RX ADMIN — SODIUM CHLORIDE 75 ML/HR: 9 INJECTION, SOLUTION INTRAVENOUS at 17:09

## 2024-03-28 RX ADMIN — OSELTAMAVIR PHOSPHATE 30 MG: 30 CAPSULE ORAL at 05:18

## 2024-03-28 RX ADMIN — POLYETHYLENE GLYCOL 3350 17 G: 17 POWDER, FOR SOLUTION ORAL at 17:10

## 2024-03-28 SDOH — SOCIAL STABILITY: SOCIAL INSECURITY: DOES ANYONE TRY TO KEEP YOU FROM HAVING/CONTACTING OTHER FRIENDS OR DOING THINGS OUTSIDE YOUR HOME?: NO

## 2024-03-28 SDOH — SOCIAL STABILITY: SOCIAL INSECURITY: DO YOU FEEL ANYONE HAS EXPLOITED OR TAKEN ADVANTAGE OF YOU FINANCIALLY OR OF YOUR PERSONAL PROPERTY?: NO

## 2024-03-28 SDOH — SOCIAL STABILITY: SOCIAL INSECURITY: ARE YOU OR HAVE YOU BEEN THREATENED OR ABUSED PHYSICALLY, EMOTIONALLY, OR SEXUALLY BY ANYONE?: NO

## 2024-03-28 SDOH — SOCIAL STABILITY: SOCIAL INSECURITY: ABUSE: ADULT

## 2024-03-28 SDOH — SOCIAL STABILITY: SOCIAL INSECURITY: WERE YOU ABLE TO COMPLETE ALL THE BEHAVIORAL HEALTH SCREENINGS?: YES

## 2024-03-28 SDOH — SOCIAL STABILITY: SOCIAL INSECURITY: HAVE YOU HAD THOUGHTS OF HARMING ANYONE ELSE?: NO

## 2024-03-28 SDOH — SOCIAL STABILITY: SOCIAL INSECURITY: ARE THERE ANY APPARENT SIGNS OF INJURIES/BEHAVIORS THAT COULD BE RELATED TO ABUSE/NEGLECT?: NO

## 2024-03-28 SDOH — SOCIAL STABILITY: SOCIAL INSECURITY: HAS ANYONE EVER THREATENED TO HURT YOUR FAMILY OR YOUR PETS?: NO

## 2024-03-28 SDOH — SOCIAL STABILITY: SOCIAL INSECURITY: DO YOU FEEL UNSAFE GOING BACK TO THE PLACE WHERE YOU ARE LIVING?: NO

## 2024-03-28 ASSESSMENT — COGNITIVE AND FUNCTIONAL STATUS - GENERAL
MOVING FROM LYING ON BACK TO SITTING ON SIDE OF FLAT BED WITH BEDRAILS: A LITTLE
DRESSING REGULAR UPPER BODY CLOTHING: A LITTLE
STANDING UP FROM CHAIR USING ARMS: A LITTLE
STANDING UP FROM CHAIR USING ARMS: A LITTLE
EATING MEALS: A LITTLE
CLIMB 3 TO 5 STEPS WITH RAILING: A LOT
DAILY ACTIVITIY SCORE: 16
HELP NEEDED FOR BATHING: A LOT
PATIENT BASELINE BEDBOUND: NO
DRESSING REGULAR LOWER BODY CLOTHING: A LOT
DRESSING REGULAR UPPER BODY CLOTHING: A LOT
CLIMB 3 TO 5 STEPS WITH RAILING: A LOT
WALKING IN HOSPITAL ROOM: A LOT
TOILETING: A LITTLE
TURNING FROM BACK TO SIDE WHILE IN FLAT BAD: A LITTLE
MOVING FROM LYING ON BACK TO SITTING ON SIDE OF FLAT BED WITH BEDRAILS: A LITTLE
TURNING FROM BACK TO SIDE WHILE IN FLAT BAD: A LITTLE
TOILETING: A LOT
EATING MEALS: A LITTLE
MOBILITY SCORE: 16
MOVING TO AND FROM BED TO CHAIR: A LITTLE
PERSONAL GROOMING: A LITTLE
DRESSING REGULAR LOWER BODY CLOTHING: A LOT
MOVING TO AND FROM BED TO CHAIR: A LITTLE
MOBILITY SCORE: 16
DAILY ACTIVITIY SCORE: 13
PERSONAL GROOMING: A LOT
HELP NEEDED FOR BATHING: A LOT
WALKING IN HOSPITAL ROOM: A LOT

## 2024-03-28 ASSESSMENT — COLUMBIA-SUICIDE SEVERITY RATING SCALE - C-SSRS
2. HAVE YOU ACTUALLY HAD ANY THOUGHTS OF KILLING YOURSELF?: NO
2. HAVE YOU ACTUALLY HAD ANY THOUGHTS OF KILLING YOURSELF?: NO
1. IN THE PAST MONTH, HAVE YOU WISHED YOU WERE DEAD OR WISHED YOU COULD GO TO SLEEP AND NOT WAKE UP?: NO
1. IN THE PAST MONTH, HAVE YOU WISHED YOU WERE DEAD OR WISHED YOU COULD GO TO SLEEP AND NOT WAKE UP?: NO
6. HAVE YOU EVER DONE ANYTHING, STARTED TO DO ANYTHING, OR PREPARED TO DO ANYTHING TO END YOUR LIFE?: NO
6. HAVE YOU EVER DONE ANYTHING, STARTED TO DO ANYTHING, OR PREPARED TO DO ANYTHING TO END YOUR LIFE?: NO

## 2024-03-28 ASSESSMENT — LIFESTYLE VARIABLES
HOW OFTEN DO YOU HAVE 6 OR MORE DRINKS ON ONE OCCASION: NEVER
AUDIT-C TOTAL SCORE: 0
AUDIT-C TOTAL SCORE: 0
HOW OFTEN DO YOU HAVE A DRINK CONTAINING ALCOHOL: NEVER
SKIP TO QUESTIONS 9-10: 1
HOW MANY STANDARD DRINKS CONTAINING ALCOHOL DO YOU HAVE ON A TYPICAL DAY: PATIENT DOES NOT DRINK

## 2024-03-28 ASSESSMENT — ACTIVITIES OF DAILY LIVING (ADL)
PATIENT'S MEMORY ADEQUATE TO SAFELY COMPLETE DAILY ACTIVITIES?: NO
HEARING - LEFT EAR: FUNCTIONAL
GROOMING: NEEDS ASSISTANCE
WALKS IN HOME: NEEDS ASSISTANCE
BATHING: NEEDS ASSISTANCE
FEEDING YOURSELF: NEEDS ASSISTANCE
JUDGMENT_ADEQUATE_SAFELY_COMPLETE_DAILY_ACTIVITIES: NO
ADEQUATE_TO_COMPLETE_ADL: YES
DRESSING YOURSELF: NEEDS ASSISTANCE
HEARING - RIGHT EAR: FUNCTIONAL
LACK_OF_TRANSPORTATION: NO
TOILETING: NEEDS ASSISTANCE
ASSISTIVE_DEVICE: WALKER

## 2024-03-28 ASSESSMENT — PAIN DESCRIPTION - LOCATION: LOCATION: HAND

## 2024-03-28 ASSESSMENT — PAIN - FUNCTIONAL ASSESSMENT
PAIN_FUNCTIONAL_ASSESSMENT: 0-10
PAIN_FUNCTIONAL_ASSESSMENT: 0-10

## 2024-03-28 ASSESSMENT — PAIN DESCRIPTION - PAIN TYPE: TYPE: ACUTE PAIN

## 2024-03-28 ASSESSMENT — PAIN SCALES - GENERAL
PAINLEVEL_OUTOF10: 3
PAINLEVEL_OUTOF10: 0 - NO PAIN
PAINLEVEL_OUTOF10: 0 - NO PAIN

## 2024-03-28 ASSESSMENT — PATIENT HEALTH QUESTIONNAIRE - PHQ9
1. LITTLE INTEREST OR PLEASURE IN DOING THINGS: NOT AT ALL
2. FEELING DOWN, DEPRESSED OR HOPELESS: NOT AT ALL
SUM OF ALL RESPONSES TO PHQ9 QUESTIONS 1 & 2: 0

## 2024-03-28 ASSESSMENT — PAIN DESCRIPTION - ORIENTATION: ORIENTATION: RIGHT

## 2024-03-28 NOTE — PROGRESS NOTES
Transitional Care Coordination Progress Note:  Plan per Medical/Surgical team: treatment of Flu A, weakness, fall with tamiflu, IV fluids  Status: Observation  Payor source: medicare A/B, [place]  Discharge disposition: Home with wife ?new HH  Potential Barriers: dementia  ADOD: 3/29/2024  ROBYN Wagner RN, BSN Transitional Care Coordinator ED# [phone]      03/28/24 0716   Discharge Planning   Living Arrangements Spouse/significant other   Support Systems Spouse/significant other   Assistance Needed FLU A isolation   Type of Residence Private residence   Number of Stairs to Enter Residence 0   Number of Stairs Within Residence 0   Home or Post Acute Services None   Patient expects to be discharged to: Home with wife  HHC vs SNF?   Does the patient need discharge transport arranged? Yes   RoundTrip coordination needed? Yes   Has discharge transport been arranged? No   Financial Resource Strain   How hard is it for you to pay for the very basics like food, housing, medical care, and heating? Not hard   Housing Stability   In the last 12 months, was there a time when you were not able to pay the mortgage or rent on time? N   In the last 12 months, how many places have you lived? 1   In the last 12 months, was there a time when you did not have a steady place to sleep or slept in a shelter (including now)? N   Transportation Needs   In the past 12 months, has lack of transportation kept you from medical appointments or from getting medications? no   In the past 12 months, has lack of transportation kept you from meetings, work, or from getting things needed for daily living? No

## 2024-03-28 NOTE — PROGRESS NOTES
Pharmacy Medication History Review     Spoke to the patients spouse. Patient takes Plavix daily in the evening.  No longer takes Coreg, Hydroxyzine, Zyprexa.  Patient took all medications yesterday        Maycol Christiansen is a 78 y.o. male admitted for Influenza. Pharmacy reviewed the patient's [id number] medications and allergies for accuracy.    The list below reflectives the updated PTA list. Please review each medication in order reconciliation for additional clarification and justification.  Prior to Admission Medications   Prescriptions Last Dose Informant     ALPRAZolam (Xanax) 1 mg tablet 3/27/2024 at pm      Sig: Take 1 tablet (1 mg) by mouth once daily at bedtime.   calcitriol (Rocaltrol) 0.25 mcg capsule 3/27/2024 at am      Sig: Take 1 capsule (0.25 mcg) by mouth once daily.   Patient taking differently: Take 1 capsule (0.25 mcg) by mouth once daily. Monday-Friday   calcitriol (Rocaltrol) 0.25 mcg capsule       Sig: Take 1 capsule (0.25 mcg) by mouth 2 times a day. Only on Saturday and Sunday   clopidogrel (Plavix) 75 mg tablet 3/27/2024 at pm         Patient taking differently: Take 1 tablet (75 mg) by mouth once daily at bedtime.   docusate sodium (Colace) 100 mg capsule 3/27/2024 at pm      Sig: Take 1 capsule (100 mg) by mouth 2 times a day.   levothyroxine (Synthroid, Levoxyl) 150 mcg tablet 3/27/2024 at am      Sig: Take 1 tablet (150 mcg) by mouth once daily.   melatonin 10 mg tablet 3/27/2024 at pm      Sig: Take by mouth.   metoprolol succinate XL (Toprol-XL) 25 mg 24 hr tablet 3/27/2024 at am      Sig: Take 1 tablet (25 mg) by mouth once daily.   mirtazapine (Remeron) 30 mg tablet 3/27/2024 at pm      Sig: Take 1 tablet (30 mg) by mouth once daily at bedtime.   multivit-iron-minerals-folic acid (Centrum Silver) 0.4 mg-300 mcg- 250 mcg tab 3/27/2024 at am      Sig: Take 1 tablet by mouth once daily.   rosuvastatin (Crestor) 5 mg tablet 3/27/2024 at pm         Patient taking differently:  Take 1 tablet (5 mg) by mouth once daily at bedtime.      Facility-Administered Medications: None         The list below reflectives the updated allergy list. Please review each documented allergy for additional clarification and justification.  Allergies  Reviewed by Dominique Saunders RN on 3/28/2024        Severity Reactions Comments    Codeine Not Specified Unknown     Sulfa (sulfonamide Antibiotics) Not Specified Unknown     Ciprofloxacin Low Diarrhea, Hives, Rash     Latex Low Rash     Penicillins Low Rash, Unknown     Sulfamethoxazole Low Rash     Wool Low Rash wool            Below are additional concerns with the patient's PTA list.      Paige Natarajan

## 2024-03-28 NOTE — CARE PLAN
Problem: Pain  Goal: My pain/discomfort is manageable  3/28/2024 1744 by Roosevelt Braden RN  Outcome: Progressing  3/28/2024 1742 by Roosevelt Braden RN  Outcome: Progressing     Problem: Safety  Goal: Patient will be injury free during hospitalization  3/28/2024 1744 by Roosevelt Braden RN  Outcome: Progressing  3/28/2024 1742 by Roosevelt Braden RN  Outcome: Progressing  Goal: I will remain free of falls  3/28/2024 1744 by Roosevelt Braden RN  Outcome: Progressing  3/28/2024 1742 by Roosevelt Braden RN  Outcome: Progressing     Problem: Daily Care  Goal: Daily care needs are met  3/28/2024 1744 by Roosevelt Braden RN  Outcome: Progressing  3/28/2024 1742 by Roosevelt Braden RN  Outcome: Progressing     Problem: Psychosocial Needs  Goal: Demonstrates ability to cope with hospitalization/illness  3/28/2024 1744 by Roosevelt Braden RN  Outcome: Progressing  3/28/2024 1742 by Roosevelt Braden RN  Outcome: Progressing  Goal: Collaborate with me, my family, and caregiver to identify my specific goals  3/28/2024 1744 by Roosevelt Braden RN  Outcome: Progressing  3/28/2024 1742 by Roosevelt Braden RN  Outcome: Progressing  Flowsheets (Taken 3/28/2024 1739)  Cultural Requests During Hospitalization: n/a  Spiritual Requests During Hospitalization: n/a     Problem: Discharge Barriers  Goal: My discharge needs are met  3/28/2024 1744 by Roosevelt Braden RN  Outcome: Progressing  3/28/2024 1742 by Roosevelt Braden RN  Outcome: Progressing     Problem: Skin  Goal: Participates in plan/prevention/treatment measures  3/28/2024 1744 by Roosevelt Braden RN  Outcome: Progressing  Flowsheets (Taken 3/28/2024 1744)  Participates in plan/prevention/treatment measures:   Elevate heels   Increase activity/out of bed for meals  3/28/2024 1742 by Roosevelt Braden RN  Outcome: Progressing  Flowsheets (Taken 3/28/2024 1742)  Participates in plan/prevention/treatment measures:   Elevate  heels   Increase activity/out of bed for meals  Goal: Prevent/manage excess moisture  3/28/2024 1744 by Roosevelt Braden RN  Outcome: Progressing  Flowsheets (Taken 3/28/2024 1744)  Prevent/manage excess moisture: Cleanse incontinence/protect with barrier cream  3/28/2024 1742 by Roosevelt Braden RN  Outcome: Progressing  Flowsheets (Taken 3/28/2024 1742)  Prevent/manage excess moisture: Cleanse incontinence/protect with barrier cream   The patient's goals for the shift include Remain free from falls    The clinical goals for the shift include Remain free from falls

## 2024-03-28 NOTE — PROGRESS NOTES
Home with wife  C vs SNF?     03/28/24 0715   Current Planned Discharge Disposition   Current Planned Discharge Disposition Home

## 2024-03-28 NOTE — H&P
"History Of Present Illness  Maycol Christiansen is a 78 y.o. male presenting with  PMH that includes moderately-severe dementia, HTN, CKD stage III, RA, polyarthritis, HTN, HLD, GERD, thyroid dysfunction, CAD, thoracic aortic aneurysm who presents to the ED via EMS for evaluation following possible fall and flulike symptoms . Pt unable to offer history. Tells me he was sick and was sent here but cannot elaborate further. ER work up noted, .Per EMS, they responded to a call stating patient had a near fall in his bathroom.  EMS also states that family told them patient has had a cough and has \"not been feeling well\".  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    Surgical History  He has a past surgical history that includes Hand surgery (04/08/2013); Knee surgery (04/08/2013); Other surgical history (01/24/2022); Other surgical history (01/24/2022); Other surgical history (11/04/2015); MR angio head wo IV contrast (4/5/2015); MR angio neck wo IV contrast (4/5/2015); MR angio head wo IV contrast (1/25/2017); MR angio neck wo IV contrast (1/25/2017); MR angio head wo IV contrast (4/24/2023); and MR angio neck wo IV contrast (4/24/2023).     Social History  He reports that he has quit " smoking. His smoking use included cigarettes. He has never used smokeless tobacco. He reports current alcohol use. No history on file for drug use.    Family History  Family History   Problem Relation Name Age of Onset    No Known Problems Mother      No Known Problems Father          Allergies  Codeine, Sulfa (sulfonamide antibiotics), Ciprofloxacin, Latex, Penicillins, Sulfamethoxazole, and Wool    Review of Systems   As above    Physical Exam   Vitals and nursing note reviewed.   Constitutional:       General: He is not in acute distress.     Appearance: He is not ill-appearing or toxic-appearing.      Comments: Disheveled, malodorous elderly male   HENT:      Head: Normocephalic and atraumatic.      Comments: No signs basilar skull fracture     Right Ear: External ear normal.      Left Ear: External ear normal.      Mouth/Throat:      Mouth: Mucous membranes are dry.   Eyes:      Pupils: Pupils are equal, round, and reactive to light.   Cardiovascular:      Rate and Rhythm: Normal rate and regular rhythm.      Pulses: Normal pulses.      Heart sounds: Murmur heard.   Pulmonary:      Effort: Pulmonary effort is normal.      Comments: Breath sounds decreased  Abdominal:      General: Bowel sounds are normal.      Palpations: Abdomen is soft.      Tenderness: There is no abdominal tenderness. There is no guarding or rebound.   Musculoskeletal:      Comments: Patient moves all 4 extremities spontaneously   Skin:     General: Skin is warm and dry.      Capillary Refill: Capillary refill takes less than 2 seconds.   Neurological:      Mental Status: He is alert.      Cranial Nerves: No cranial nerve deficit.      Sensory: No sensory deficit.      Motor: No weakness.   Last Recorded Vitals  /66   Pulse 69   Temp 37.4 °C (99.4 °F) (Temporal)   Resp (!) 22   Wt 63.5 kg (140 lb)   SpO2 (!) 93%     Relevant Results               No current facility-administered medications on file prior to encounter.     Current  Outpatient Medications on File Prior to Encounter   Medication Sig Dispense Refill    ALPRAZolam (Xanax) 1 mg tablet Take 1 tablet (1 mg) by mouth once daily at bedtime. 14 tablet 0    calcitriol (Rocaltrol) 0.25 mcg capsule Take 1 capsule (0.25 mcg) by mouth once daily. (Patient taking differently: Take 1 capsule (0.25 mcg) by mouth once daily. Monday-Friday) 90 capsule 1    calcitriol (Rocaltrol) 0.25 mcg capsule Take 1 capsule (0.25 mcg) by mouth 2 times a day. Only on Saturday and Sunday      clopidogrel (Plavix) 75 mg tablet Take 1 Tab Daily (Patient taking differently: Take 1 tablet (75 mg) by mouth once daily at bedtime.) 90 tablet 3    docusate sodium (Colace) 100 mg capsule Take 1 capsule (100 mg) by mouth 2 times a day.      levothyroxine (Synthroid, Levoxyl) 150 mcg tablet Take 1 tablet (150 mcg) by mouth once daily. 90 tablet 2    melatonin 10 mg tablet Take by mouth.      metoprolol succinate XL (Toprol-XL) 25 mg 24 hr tablet Take 1 tablet (25 mg) by mouth once daily. 90 tablet 3    mirtazapine (Remeron) 30 mg tablet Take 1 tablet (30 mg) by mouth once daily at bedtime.      multivit-iron-minerals-folic acid (Centrum Silver) 0.4 mg-300 mcg- 250 mcg tab Take 1 tablet by mouth once daily.      rosuvastatin (Crestor) 5 mg tablet Take 1 tablet (5 mg) by mouth once daily. (Patient taking differently: Take 1 tablet (5 mg) by mouth once daily at bedtime.) 90 tablet 1    [DISCONTINUED] atorvastatin (Lipitor) 10 mg tablet Take 1 tablet (10 mg) by mouth as needed at bedtime.      [DISCONTINUED] carvedilol (Coreg) 6.25 mg tablet Take by mouth twice a day.      [DISCONTINUED] dexAMETHasone (Decadron) 6 mg tablet Take 1 tablet (6 mg) by mouth.      [DISCONTINUED] hydrOXYzine pamoate (Vistaril) 25 mg capsule Take 1 capsule (25 mg) by mouth.      [DISCONTINUED] OLANZapine zydis (ZyPREXA) 5 mg disintegrating tablet Take 1 tablet (5 mg) by mouth.      [DISCONTINUED] polyethylene glycol (Glycolax, Miralax) 17 gram/dose  powder Take 17 g by mouth once daily.         Results for orders placed or performed during the hospital encounter of 03/28/24 (from the past 24 hour(s))   Sars-CoV-2 and Influenza A/B PCR   Result Value Ref Range    Flu A Result Detected (A) Not Detected    Flu B Result Not Detected Not Detected    Coronavirus 2019, PCR Not Detected Not Detected   RSV PCR   Result Value Ref Range    RSV PCR Not Detected Not Detected   ECG 12 Lead   Result Value Ref Range    Ventricular Rate 80 BPM    Atrial Rate 80 BPM    HI Interval 252 ms    QRS Duration 168 ms    QT Interval 410 ms    QTC Calculation(Bazett) 472 ms    P Axis 91 degrees    R Axis 266 degrees    T Axis 46 degrees    QRS Count 13 beats    Q Onset 206 ms    P Onset 80 ms    P Offset 149 ms    T Offset 411 ms    QTC Fredericia 451 ms   CBC and Auto Differential   Result Value Ref Range    WBC 6.2 4.4 - 11.3 x10*3/uL    nRBC 0.0 0.0 - 0.0 /100 WBCs    RBC 3.82 (L) 4.50 - 5.90 x10*6/uL    Hemoglobin 10.8 (L) 13.5 - 17.5 g/dL    Hematocrit 33.9 (L) 41.0 - 52.0 %    MCV 89 80 - 100 fL    MCH 28.3 26.0 - 34.0 pg    MCHC 31.9 (L) 32.0 - 36.0 g/dL    RDW 13.5 11.5 - 14.5 %    Platelets 114 (L) 150 - 450 x10*3/uL    Neutrophils % 83.8 40.0 - 80.0 %    Immature Granulocytes %, Automated 0.3 0.0 - 0.9 %    Lymphocytes % 6.1 13.0 - 44.0 %    Monocytes % 9.1 2.0 - 10.0 %    Eosinophils % 0.5 0.0 - 6.0 %    Basophils % 0.2 0.0 - 2.0 %    Neutrophils Absolute 5.23 1.60 - 5.50 x10*3/uL    Immature Granulocytes Absolute, Automated 0.02 0.00 - 0.50 x10*3/uL    Lymphocytes Absolute 0.38 (L) 0.80 - 3.00 x10*3/uL    Monocytes Absolute 0.57 0.05 - 0.80 x10*3/uL    Eosinophils Absolute 0.03 0.00 - 0.40 x10*3/uL    Basophils Absolute 0.01 0.00 - 0.10 x10*3/uL   Magnesium   Result Value Ref Range    Magnesium 1.70 1.60 - 2.40 mg/dL   Comprehensive Metabolic Panel   Result Value Ref Range    Glucose 115 (H) 74 - 99 mg/dL    Sodium 139 136 - 145 mmol/L    Potassium 3.7 3.5 - 5.3 mmol/L     Chloride 104 98 - 107 mmol/L    Bicarbonate 28 21 - 32 mmol/L    Anion Gap 11 10 - 20 mmol/L    Urea Nitrogen 24 (H) 6 - 23 mg/dL    Creatinine 1.82 (H) 0.50 - 1.30 mg/dL    eGFR 37 (L) >60 mL/min/1.73m*2    Calcium 10.0 8.6 - 10.3 mg/dL    Albumin 4.2 3.4 - 5.0 g/dL    Alkaline Phosphatase 52 33 - 136 U/L    Total Protein 6.6 6.4 - 8.2 g/dL    AST 27 9 - 39 U/L    Bilirubin, Total 1.6 (H) 0.0 - 1.2 mg/dL    ALT 18 10 - 52 U/L   Lactate   Result Value Ref Range    Lactate 1.0 0.4 - 2.0 mmol/L   Troponin I, High Sensitivity, Initial   Result Value Ref Range    Troponin I, High Sensitivity 25 (H) 0 - 20 ng/L   Troponin I, High Sensitivity   Result Value Ref Range    Troponin I, High Sensitivity 30 (H) 0 - 20 ng/L   Urinalysis with Reflex Microscopic   Result Value Ref Range    Color, Urine Yellow Light-Yellow, Yellow, Dark-Yellow    Appearance, Urine Turbid (N) Clear    Specific Gravity, Urine 1.021 1.005 - 1.035    pH, Urine 5.5 5.0, 5.5, 6.0, 6.5, 7.0, 7.5, 8.0    Protein, Urine 20 (TRACE) NEGATIVE, 10 (TRACE), 20 (TRACE) mg/dL    Glucose, Urine Normal Normal mg/dL    Blood, Urine NEGATIVE NEGATIVE    Ketones, Urine NEGATIVE NEGATIVE mg/dL    Bilirubin, Urine NEGATIVE NEGATIVE    Urobilinogen, Urine Normal Normal mg/dL    Nitrite, Urine NEGATIVE NEGATIVE    Leukocyte Esterase, Urine NEGATIVE NEGATIVE   Microscopic Only, Urine   Result Value Ref Range    WBC, Urine 1-5 1-5, NONE /HPF    RBC, Urine NONE NONE, 1-2, 3-5 /HPF    Mucus, Urine FEW Reference range not established. /LPF    Hyaline Casts, Urine OCCASIONAL (A) NONE /LPF    Calcium Oxalate Crystals, Urine 1+ NONE, 1+ /HPF         Assessment/Plan   Principal Problem:    Influenza   Cont w tamiflu and supportive care    HTN  CKDIII  HLD  CAD  -monitor labs  Tele  Resume home meds  Repeat trop    Dementia  Supportive care       Need updated medication list  D/w nurse    -Continue current treatment as ordered. Will make adjustments as necessary.    VTE  Prophylaxis  -See Orders      Pt seen in ER    Plan of care discussed with: Provider, RN, Patient  .    Patient case and plan of care discussed with Dr. CLIFF Mathew.    ALISTAIR Marques - CNP  -In collaboration with Dr. CLIFF Mathew    [facility]  Office: [phone]  Fax: [phone]  I have reviewed the above note obtained and documented by the NP/PA and I personally participated in the key components. I have discussed the case and management of the patient's care. Changes made to the note, and all key components of history and physical/progress note done by me.  dw nursing  Pt seen in ER   He does have h/o dementia, here for fall and noted to have flu  No pain   No distress  Pt is on RA  O/e confused , forgetful  Chest rea  [facility] regualr  Ext no edema  Abdo soft nontender bs active, no masses  Labs reviewed  A/p cont with home meds once known  Cont with tamiflu  Monitor for delerium  Chace follow  MD Ean Enriquez MD

## 2024-03-28 NOTE — ED TRIAGE NOTES
Pt came in via EMS for a fall, Pt was unsteady and leaned over his sink. No LOC or blood thinners Pts family stated to EMS that pt has a cough and has not been felling well.

## 2024-03-28 NOTE — ED PROVIDER NOTES
"Chief Complaint   Patient presents with    Flu Symptoms    Fall     Limitations to History: Dementia, age  Additional History Obtained from: EMS, wife    HPI:   Maycol Christiansen is an 78 y.o. with complicated PMH that includes moderately-severe dementia, HTN, CKD stage III, RA, polyarthritis, HTN, HLD, GERD, thyroid dysfunction, CAD, thoracic aortic aneurysm who presents to the ED via EMS for evaluation following possible fall and flulike symptoms.  Per EMS, they responded to a call stating patient had a near fall in his bathroom.  EMS also states that family told them patient has had a cough and has \"not been feeling well\". Patient, however, is not complaining of flulike symptoms nor fall.  He is endorsing right foot pain.  He does not remember falling.  He said the last time he fell was 1 week ago.  He denies any headache, chest pain, shortness of breath, abdominal pain, dysuria.  He states \"I just feel off\".  Patient is alert to name, date of birth, month.  Does not know year.  Does not remember if he has history of heart problems or dementia.  Does not know if he is on anticoagulants.  Medication review does not show anticoagulation.     Spoke to patient's wife on the phone to obtain additional history.  She states that patient and her both have a cough that developed yesterday she has been giving him Robitussin.  Tonight he got up in the middle night to use the bathroom and was unsteady on his feet.  She gave him his walker.  He walked into the bathroom and she said \"it was like it was in slow motion he just started to lean and almost fall forward\".  She says she was able to grab him by his pants to help steady him and then help ease him to the floor where he stayed while she called EMS.  She said he did not actually fall.  He did not hit his head nor lose consciousness.  He is not on anticoagulants.  At baseline he does not normally know month, date, medications or other details.    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    Personal history of other diseases of the circulatory system 02/16/2015    History of orthostatic hypotension    Personal history of other endocrine, nutritional and metabolic disease 11/04/2015    History of hypothyroidism    Personal history of other mental and behavioral disorders 10/08/2020    History of dementia    Personal history of transient ischemic attack (TIA), and cerebral infarction without residual deficits 07/31/2017    History of stroke    Pneumonia     Weakness      Past Surgical History:   Procedure Laterality Date    HAND SURGERY  04/08/2013    Hand Surgery                                                                                                                                                          KNEE SURGERY  04/08/2013    Knee Surgery    MR HEAD ANGIO WO IV CONTRAST  4/5/2015    MR HEAD ANGIO WO IV CONTRAST 4/5/2015 [email or web address] CLINICAL LEGACY    MR HEAD ANGIO WO IV CONTRAST  1/25/2017    MR HEAD ANGIO WO IV CONTRAST 1/25/2017 [email or web address] CLINICAL LEGACY    MR HEAD ANGIO WO IV CONTRAST  4/24/2023    MR HEAD ANGIO WO IV CONTRAST [facility]    MR NECK ANGIO WO IV CONTRAST  4/5/2015    MR NECK ANGIO WO IV CONTRAST 4/5/2015 [facility] [email or web address] CLINICAL LEGACY    MR NECK ANGIO WO IV CONTRAST  1/25/2017    MR NECK ANGIO WO IV CONTRAST 1/25/2017 [facility] [email or web address] CLINICAL LEGACY    MR NECK ANGIO WO IV CONTRAST   4/24/2023    MR NECK ANGIO WO IV CONTRAST [facility] MRI    OTHER SURGICAL HISTORY  01/24/2022    Cystoscopy    OTHER SURGICAL HISTORY  01/24/2022    Hernia repair    OTHER SURGICAL HISTORY  11/04/2015    Incision Of Uvula     Family History   Problem Relation Name Age of Onset    No Known Problems Mother      No Known Problems Father        Physical Exam  Vitals and nursing note reviewed.   Constitutional:       General: He is not in acute distress.     Appearance: He is not ill-appearing or toxic-appearing.      Comments: Disheveled, malodorous elderly male   HENT:      Head: Normocephalic and atraumatic.      Comments: No signs basilar skull fracture     Right Ear: External ear normal.      Left Ear: External ear normal.      Mouth/Throat:      Mouth: Mucous membranes are dry.   Eyes:      Pupils: Pupils are equal, round, and reactive to light.   Cardiovascular:      Rate and Rhythm: Normal rate and regular rhythm.      Pulses: Normal pulses.      Heart sounds: Murmur heard.   Pulmonary:      Effort: Pulmonary effort is normal.      Comments: Breath sounds decreased  Abdominal:      General: Bowel sounds are normal.      Palpations: Abdomen is soft.      Tenderness: There is no abdominal tenderness. There is no guarding or rebound.   Musculoskeletal:      Comments: Patient moves all 4 extremities spontaneously   Skin:     General: Skin is warm and dry.      Capillary Refill: Capillary refill takes less than 2 seconds.   Neurological:      Mental Status: He is alert.      Cranial Nerves: No cranial nerve deficit.      Sensory: No sensory deficit.      Motor: No weakness.     VS: As documented in the triage note and EMR flowsheet from this visit were reviewed.    External Records Reviewed: I reviewed recent and relevant outside records including: Reviewed ophthalmology note 2/20/2024.  Patient seen for AMD.  Plan not in note.  Reviewed note form patient and PCP 2/9/2024.  Patient seen for diarrhea.  Instructed to follow  "brat diet and ensure patient stays well-hydrated.    EKG INTERPRETATION:      Personally Reviewed      Rhythm: Sinus rhythm with first-degree AV block.       Rate:    78 bpm     Axis: LAD       Intervals: Prolonged  ms     QRS Complex: Widened QRS, RBBB         QT Interval: QTc 471 ms      Compared with Prior: Similar to prior      Medical Decision Making:   ED Course as of 03/28/24 0556   Thu Mar 28, 2024   0251 Vitals Reviewed: Afebrile. Normotensive. Not tachycardic nor tachypneic. No hypoxia.   [KA]   0302 Per EMS, patient is 78-year-old male who presents to the ED for evaluation following fall and flulike symptoms.  Patient however is not complaining of flulike symptoms.  He is endorsing right foot pain.  He does not remember falling.  He said the last time he fell was 1 week ago.  He denies any headache, chest pain, shortness of breath, abdominal pain, dysuria.  He states \"I just feel off\". [KA]   0356 I personally viewed chest x-ray.  Concern for right lower lobe pneumonia. [KA]   0455 I personally viewed labs.  Flu a positive.  CBC shows normocytic anemia similar to patient's baseline.  CMP shows normal electrolytes with creatinine 1.2 which is improvement from prior.  Mildly elevated total bilirubin at 1.6 although patient did not complain of any abdominal pain and otherwise LFTs are normal.  RSV and lactate normal.  Magnesium normal.  Troponin elevated at 25 although compared to prior this is around patient's baseline and may be secondary to his CKD radiology does not read chest x-ray as pneumonia.  I discussed lab results with patient.  He would like to start Tamiflu.  Will order first dose in the ED. [KA]   0507 CT imaging negative for any acute findings.  RN is obtaining delta troponin.  Patient to be given 500 mL bolus of normal saline and first dose of Tamiflu. [KA]   0555 Patient signed out to incoming provider pending repeat troponin and disposition.  He does not want to be admitted for " influenza.  At this time he is saturating well and he is not tachycardic I feel that this is reasonable as long as his troponin is not significantly elevated. [KA]      ED Course User Index  [KA] Laila Burgos PA-C         Diagnoses as of 03/28/24 0556   Influenza A      Chronic Medical Conditions Significantly Affecting Care: Age, dementia    Discussion of Management with Other Providers:  I discussed the patient/results with: Attending Chad    Counseling: Spoke with the patient and discussed today´s findings, in addition to providing specific details for the plan of care and expected course.  Patient was given the opportunity to ask questions.  Educated on the common potential side effects of medications prescribed.  The plan of care was mutually agreed upon with the patient. The patient and/or family were given the opportunity to ask questions. All questions asked today in the ED were answered to the best of my ability with today's information.  This patient was cared for in the setting of nationwide stress on resources and staffing.    This report was transcribed using voice recognition software.  Every effort was made to ensure accuracy, however, inadvertently computerized transcription errors may be present.       Laila Burgos PA-C  03/28/24 0556

## 2024-03-28 NOTE — PROGRESS NOTES
03/28/24 0715   [facility] Disability Status   Are you deaf or do you have serious difficulty hearing? N   Are you blind or do you have serious difficulty seeing, even when wearing glasses? N   Because of a physical, mental, or emotional condition, do you have serious difficulty concentrating, remembering, or making decisions? (5 years old or older) Y  (dementia)   Do you have serious difficulty walking or climbing stairs? Y   Do you have serious difficulty dressing or bathing? N   Because of a physical, mental, or emotional condition, do you have serious difficulty doing errands alone such as visiting the doctor? Y

## 2024-03-28 NOTE — CARE PLAN
Problem: Pain  Goal: My pain/discomfort is manageable  Outcome: Progressing     Problem: Safety  Goal: Patient will be injury free during hospitalization  Outcome: Progressing  Goal: I will remain free of falls  Outcome: Progressing     Problem: Daily Care  Goal: Daily care needs are met  Outcome: Progressing     Problem: Psychosocial Needs  Goal: Demonstrates ability to cope with hospitalization/illness  Outcome: Progressing  Goal: Collaborate with me, my family, and caregiver to identify my specific goals  Outcome: Progressing  Flowsheets (Taken 3/28/2024 7246)  Cultural Requests During Hospitalization: n/a  Spiritual Requests During Hospitalization: n/a     Problem: Discharge Barriers  Goal: My discharge needs are met  Outcome: Progressing     Problem: Skin  Goal: Participates in plan/prevention/treatment measures  Outcome: Progressing  Flowsheets (Taken 3/28/2024 1742)  Participates in plan/prevention/treatment measures:   Elevate heels   Increase activity/out of bed for meals  Goal: Prevent/manage excess moisture  Outcome: Progressing  Flowsheets (Taken 3/28/2024 1742)  Prevent/manage excess moisture: Cleanse incontinence/protect with barrier cream   The patient's goals for the shift include Remain free from falls    The clinical goals for the shift include Remain free from falls

## 2024-03-29 LAB
ANION GAP SERPL CALC-SCNC: 11 MMOL/L (ref 10–20)
ATRIAL RATE: 80 BPM
BUN SERPL-MCNC: 28 MG/DL (ref 6–23)
CALCIUM SERPL-MCNC: 9.4 MG/DL (ref 8.6–10.3)
CARDIAC TROPONIN I PNL SERPL HS: 138 NG/L (ref 0–20)
CHLORIDE SERPL-SCNC: 107 MMOL/L (ref 98–107)
CO2 SERPL-SCNC: 26 MMOL/L (ref 21–32)
CREAT SERPL-MCNC: 1.85 MG/DL (ref 0.5–1.3)
EGFRCR SERPLBLD CKD-EPI 2021: 36 ML/MIN/1.73M*2
ERYTHROCYTE [DISTWIDTH] IN BLOOD BY AUTOMATED COUNT: 13.4 % (ref 11.5–14.5)
GLUCOSE SERPL-MCNC: 118 MG/DL (ref 74–99)
HCT VFR BLD AUTO: 28.7 % (ref 41–52)
HGB BLD-MCNC: 9.3 G/DL (ref 13.5–17.5)
MCH RBC QN AUTO: 29.1 PG (ref 26–34)
MCHC RBC AUTO-ENTMCNC: 32.4 G/DL (ref 32–36)
MCV RBC AUTO: 90 FL (ref 80–100)
NRBC BLD-RTO: 0 /100 WBCS (ref 0–0)
P AXIS: 91 DEGREES
P OFFSET: 149 MS
P ONSET: 80 MS
PLATELET # BLD AUTO: 100 X10*3/UL (ref 150–450)
POTASSIUM SERPL-SCNC: 3.8 MMOL/L (ref 3.5–5.3)
PR INTERVAL: 252 MS
Q ONSET: 206 MS
QRS COUNT: 13 BEATS
QRS DURATION: 168 MS
QT INTERVAL: 410 MS
QTC CALCULATION(BAZETT): 472 MS
QTC FREDERICIA: 451 MS
R AXIS: 266 DEGREES
RBC # BLD AUTO: 3.2 X10*6/UL (ref 4.5–5.9)
SODIUM SERPL-SCNC: 140 MMOL/L (ref 136–145)
T AXIS: 46 DEGREES
T OFFSET: 411 MS
VENTRICULAR RATE: 80 BPM
WBC # BLD AUTO: 7.9 X10*3/UL (ref 4.4–11.3)

## 2024-03-29 PROCEDURE — G0378 HOSPITAL OBSERVATION PER HR: HCPCS

## 2024-03-29 PROCEDURE — 2500000004 HC RX 250 GENERAL PHARMACY W/ HCPCS (ALT 636 FOR OP/ED): Performed by: NURSE PRACTITIONER

## 2024-03-29 PROCEDURE — 2500000001 HC RX 250 WO HCPCS SELF ADMINISTERED DRUGS (ALT 637 FOR MEDICARE OP): Performed by: FAMILY MEDICINE

## 2024-03-29 PROCEDURE — 2500000001 HC RX 250 WO HCPCS SELF ADMINISTERED DRUGS (ALT 637 FOR MEDICARE OP): Performed by: PEDIATRICS

## 2024-03-29 PROCEDURE — 82374 ASSAY BLOOD CARBON DIOXIDE: CPT | Performed by: NURSE PRACTITIONER

## 2024-03-29 PROCEDURE — 96372 THER/PROPH/DIAG INJ SC/IM: CPT | Performed by: NURSE PRACTITIONER

## 2024-03-29 PROCEDURE — 84484 ASSAY OF TROPONIN QUANT: CPT | Performed by: NURSE PRACTITIONER

## 2024-03-29 PROCEDURE — 85027 COMPLETE CBC AUTOMATED: CPT | Performed by: NURSE PRACTITIONER

## 2024-03-29 PROCEDURE — 2500000002 HC RX 250 W HCPCS SELF ADMINISTERED DRUGS (ALT 637 FOR MEDICARE OP, ALT 636 FOR OP/ED): Performed by: FAMILY MEDICINE

## 2024-03-29 PROCEDURE — 36415 COLL VENOUS BLD VENIPUNCTURE: CPT | Performed by: NURSE PRACTITIONER

## 2024-03-29 PROCEDURE — 99222 1ST HOSP IP/OBS MODERATE 55: CPT | Performed by: INTERNAL MEDICINE

## 2024-03-29 PROCEDURE — 97530 THERAPEUTIC ACTIVITIES: CPT | Mod: GO

## 2024-03-29 PROCEDURE — 97165 OT EVAL LOW COMPLEX 30 MIN: CPT | Mod: GO

## 2024-03-29 RX ORDER — LEVOTHYROXINE SODIUM 75 UG/1
150 TABLET ORAL DAILY
Status: DISCONTINUED | OUTPATIENT
Start: 2024-03-29 | End: 2024-03-30 | Stop reason: HOSPADM

## 2024-03-29 RX ORDER — ROSUVASTATIN CALCIUM 10 MG/1
5 TABLET, COATED ORAL NIGHTLY
Status: DISCONTINUED | OUTPATIENT
Start: 2024-03-29 | End: 2024-03-30 | Stop reason: HOSPADM

## 2024-03-29 RX ORDER — DOCUSATE SODIUM 100 MG/1
100 CAPSULE, LIQUID FILLED ORAL 2 TIMES DAILY
Status: DISCONTINUED | OUTPATIENT
Start: 2024-03-29 | End: 2024-03-30 | Stop reason: HOSPADM

## 2024-03-29 RX ORDER — CALCITRIOL 0.25 UG/1
0.25 CAPSULE ORAL DAILY
Status: DISCONTINUED | OUTPATIENT
Start: 2024-03-29 | End: 2024-03-30 | Stop reason: HOSPADM

## 2024-03-29 RX ORDER — TALC
9 POWDER (GRAM) TOPICAL NIGHTLY
Status: DISCONTINUED | OUTPATIENT
Start: 2024-03-29 | End: 2024-03-30 | Stop reason: HOSPADM

## 2024-03-29 RX ORDER — MULTIVIT-MIN/IRON FUM/FOLIC AC 7.5 MG-4
1 TABLET ORAL DAILY
Status: DISCONTINUED | OUTPATIENT
Start: 2024-03-29 | End: 2024-03-30 | Stop reason: HOSPADM

## 2024-03-29 RX ORDER — MIRTAZAPINE 15 MG/1
30 TABLET, FILM COATED ORAL NIGHTLY
Status: DISCONTINUED | OUTPATIENT
Start: 2024-03-29 | End: 2024-03-30 | Stop reason: HOSPADM

## 2024-03-29 RX ORDER — CALCITRIOL 0.25 UG/1
0.25 CAPSULE ORAL DAILY
Status: CANCELLED | COMMUNITY
Start: 2024-03-29

## 2024-03-29 RX ORDER — CLOPIDOGREL BISULFATE 75 MG/1
75 TABLET ORAL NIGHTLY
Status: DISCONTINUED | OUTPATIENT
Start: 2024-03-29 | End: 2024-03-30 | Stop reason: HOSPADM

## 2024-03-29 RX ORDER — METOPROLOL SUCCINATE 25 MG/1
25 TABLET, EXTENDED RELEASE ORAL DAILY
Status: DISCONTINUED | OUTPATIENT
Start: 2024-03-29 | End: 2024-03-30 | Stop reason: HOSPADM

## 2024-03-29 RX ORDER — IPRATROPIUM BROMIDE AND ALBUTEROL SULFATE 2.5; .5 MG/3ML; MG/3ML
3 SOLUTION RESPIRATORY (INHALATION) EVERY 2 HOUR PRN
Status: DISCONTINUED | OUTPATIENT
Start: 2024-03-29 | End: 2024-03-30 | Stop reason: HOSPADM

## 2024-03-29 RX ORDER — ALPRAZOLAM 1 MG/1
1 TABLET ORAL NIGHTLY
Status: DISCONTINUED | OUTPATIENT
Start: 2024-03-29 | End: 2024-03-30 | Stop reason: HOSPADM

## 2024-03-29 RX ADMIN — Medication 9 MG: at 20:08

## 2024-03-29 RX ADMIN — MIRTAZAPINE 30 MG: 15 TABLET, FILM COATED ORAL at 20:08

## 2024-03-29 RX ADMIN — LEVOTHYROXINE SODIUM 150 MCG: 75 TABLET ORAL at 08:35

## 2024-03-29 RX ADMIN — CALCITRIOL CAPSULES 0.25 MCG 0.25 MCG: 0.25 CAPSULE ORAL at 08:35

## 2024-03-29 RX ADMIN — ALPRAZOLAM 1 MG: 1 TABLET ORAL at 20:08

## 2024-03-29 RX ADMIN — ROSUVASTATIN CALCIUM 5 MG: 10 TABLET, FILM COATED ORAL at 20:08

## 2024-03-29 RX ADMIN — DOCUSATE SODIUM 100 MG: 100 CAPSULE, LIQUID FILLED ORAL at 20:08

## 2024-03-29 RX ADMIN — CLOPIDOGREL 75 MG: 75 TABLET ORAL at 20:08

## 2024-03-29 RX ADMIN — Medication 1 TABLET: at 08:34

## 2024-03-29 RX ADMIN — METOPROLOL SUCCINATE 25 MG: 25 TABLET, EXTENDED RELEASE ORAL at 08:34

## 2024-03-29 RX ADMIN — DOCUSATE SODIUM 100 MG: 100 CAPSULE, LIQUID FILLED ORAL at 08:35

## 2024-03-29 RX ADMIN — POLYETHYLENE GLYCOL 3350 17 G: 17 POWDER, FOR SOLUTION ORAL at 08:35

## 2024-03-29 RX ADMIN — ENOXAPARIN SODIUM 30 MG: 30 INJECTION SUBCUTANEOUS at 12:38

## 2024-03-29 ASSESSMENT — COGNITIVE AND FUNCTIONAL STATUS - GENERAL
DRESSING REGULAR UPPER BODY CLOTHING: A LITTLE
HELP NEEDED FOR BATHING: A LOT
EATING MEALS: A LITTLE
HELP NEEDED FOR BATHING: A LOT
MOVING FROM LYING ON BACK TO SITTING ON SIDE OF FLAT BED WITH BEDRAILS: A LITTLE
MOBILITY SCORE: 16
PERSONAL GROOMING: A LITTLE
DRESSING REGULAR LOWER BODY CLOTHING: A LOT
WALKING IN HOSPITAL ROOM: A LOT
MOBILITY SCORE: 16
STANDING UP FROM CHAIR USING ARMS: A LITTLE
MOVING TO AND FROM BED TO CHAIR: A LITTLE
PERSONAL GROOMING: A LITTLE
MOVING TO AND FROM BED TO CHAIR: A LITTLE
DRESSING REGULAR UPPER BODY CLOTHING: A LITTLE
STANDING UP FROM CHAIR USING ARMS: A LITTLE
MOVING FROM LYING ON BACK TO SITTING ON SIDE OF FLAT BED WITH BEDRAILS: A LITTLE
DRESSING REGULAR LOWER BODY CLOTHING: A LOT
CLIMB 3 TO 5 STEPS WITH RAILING: A LOT
CLIMB 3 TO 5 STEPS WITH RAILING: A LOT
TURNING FROM BACK TO SIDE WHILE IN FLAT BAD: A LITTLE
DAILY ACTIVITIY SCORE: 15
TOILETING: A LOT
TURNING FROM BACK TO SIDE WHILE IN FLAT BAD: A LITTLE
DAILY ACTIVITIY SCORE: 15
WALKING IN HOSPITAL ROOM: A LOT
TOILETING: A LOT
EATING MEALS: A LITTLE

## 2024-03-29 ASSESSMENT — PAIN - FUNCTIONAL ASSESSMENT
PAIN_FUNCTIONAL_ASSESSMENT: 0-10
PAIN_FUNCTIONAL_ASSESSMENT: 0-10

## 2024-03-29 ASSESSMENT — PAIN SCALES - GENERAL
PAINLEVEL_OUTOF10: 0 - NO PAIN

## 2024-03-29 NOTE — CARE PLAN
The patient's goals for the shift include Remain free from falls    The clinical goals for the shift include free from falls and injury

## 2024-03-29 NOTE — PROGRESS NOTES
Maycol Christiansen is a 78 y.o. male on day 0 of admission presenting with Influenza.      Subjective   Pt seen this morning he is feeling ok  Confused however appears better then yesterdya   No c/o pain        Objective     Last Recorded Vitals  /71 (BP Location: Left arm, Patient Position: Lying)   Pulse 63   Temp 37.2 °C (98.9 °F) (Oral)   Resp 18   Wt 63.5 kg (140 lb)   SpO2 92%   Intake/Output last 3 Shifts:    Intake/Output Summary (Last 24 hours) at 3/29/2024 1542  Last data filed at 3/28/2024 1801  Gross per 24 hour   Intake 65 ml   Output --   Net 65 ml       Admission Weight  Weight: 67.6 kg (149 lb) (03/28/24 0242)    Daily Weight  03/28/24 : 63.5 kg (140 lb)    Image Results      Physical Exam  Patient is well-developed well-nourished  male he is alert he is confused  Head eye ENT exam shows pupils are equal reactive extraocular movement intact.  Chest is clear to auscultation.  CVS exam shows heart sounds are regular.  Abdomen is soft bowel sounds are active no organomegaly or masses palpable.  Extremities there is no edema  CNS patient is confused he is able to move extremities  Relevant Results  Scheduled medications  ALPRAZolam, 1 mg, oral, Nightly  calcitriol, 0.25 mcg, oral, Daily  clopidogrel, 75 mg, oral, Nightly  docusate sodium, 100 mg, oral, BID  enoxaparin, 30 mg, subcutaneous, q24h  levothyroxine, 150 mcg, oral, Daily  melatonin, 9 mg, oral, Nightly  metoprolol succinate XL, 25 mg, oral, Daily  mirtazapine, 30 mg, oral, Nightly  multivitamin with minerals, 1 tablet, oral, Daily  oseltamivir, 30 mg, oral, q24h  polyethylene glycol, 17 g, oral, Daily  rosuvastatin, 5 mg, oral, Nightly      Continuous medications     PRN medications  PRN medications: ondansetron **OR** ondansetron  Results for orders placed or performed during the hospital encounter of 03/28/24 (from the past 96 hour(s))   Sars-CoV-2 and Influenza A/B PCR   Result Value Ref Range    Flu A Result Detected (A) Not  Detected    Flu B Result Not Detected Not Detected    Coronavirus 2019, PCR Not Detected Not Detected   RSV PCR   Result Value Ref Range    RSV PCR Not Detected Not Detected   ECG 12 Lead   Result Value Ref Range    Ventricular Rate 80 BPM    Atrial Rate 80 BPM    RI Interval 252 ms    QRS Duration 168 ms    QT Interval 410 ms    QTC Calculation(Bazett) 472 ms    P Axis 91 degrees    R Axis 266 degrees    T Axis 46 degrees    QRS Count 13 beats    Q Onset 206 ms    P Onset 80 ms    P Offset 149 ms    T Offset 411 ms    QTC Fredericia 451 ms   CBC and Auto Differential   Result Value Ref Range    WBC 6.2 4.4 - 11.3 x10*3/uL    nRBC 0.0 0.0 - 0.0 /100 WBCs    RBC 3.82 (L) 4.50 - 5.90 x10*6/uL    Hemoglobin 10.8 (L) 13.5 - 17.5 g/dL    Hematocrit 33.9 (L) 41.0 - 52.0 %    MCV 89 80 - 100 fL    MCH 28.3 26.0 - 34.0 pg    MCHC 31.9 (L) 32.0 - 36.0 g/dL    RDW 13.5 11.5 - 14.5 %    Platelets 114 (L) 150 - 450 x10*3/uL    Neutrophils % 83.8 40.0 - 80.0 %    Immature Granulocytes %, Automated 0.3 0.0 - 0.9 %    Lymphocytes % 6.1 13.0 - 44.0 %    Monocytes % 9.1 2.0 - 10.0 %    Eosinophils % 0.5 0.0 - 6.0 %    Basophils % 0.2 0.0 - 2.0 %    Neutrophils Absolute 5.23 1.60 - 5.50 x10*3/uL    Immature Granulocytes Absolute, Automated 0.02 0.00 - 0.50 x10*3/uL    Lymphocytes Absolute 0.38 (L) 0.80 - 3.00 x10*3/uL    Monocytes Absolute 0.57 0.05 - 0.80 x10*3/uL    Eosinophils Absolute 0.03 0.00 - 0.40 x10*3/uL    Basophils Absolute 0.01 0.00 - 0.10 x10*3/uL   Magnesium   Result Value Ref Range    Magnesium 1.70 1.60 - 2.40 mg/dL   Comprehensive Metabolic Panel   Result Value Ref Range    Glucose 115 (H) 74 - 99 mg/dL    Sodium 139 136 - 145 mmol/L    Potassium 3.7 3.5 - 5.3 mmol/L    Chloride 104 98 - 107 mmol/L    Bicarbonate 28 21 - 32 mmol/L    Anion Gap 11 10 - 20 mmol/L    Urea Nitrogen 24 (H) 6 - 23 mg/dL    Creatinine 1.82 (H) 0.50 - 1.30 mg/dL    eGFR 37 (L) >60 mL/min/1.73m*2    Calcium 10.0 8.6 - 10.3 mg/dL    Albumin  4.2 3.4 - 5.0 g/dL    Alkaline Phosphatase 52 33 - 136 U/L    Total Protein 6.6 6.4 - 8.2 g/dL    AST 27 9 - 39 U/L    Bilirubin, Total 1.6 (H) 0.0 - 1.2 mg/dL    ALT 18 10 - 52 U/L   Lactate   Result Value Ref Range    Lactate 1.0 0.4 - 2.0 mmol/L   Troponin I, High Sensitivity, Initial   Result Value Ref Range    Troponin I, High Sensitivity 25 (H) 0 - 20 ng/L   Blood Culture    Specimen: Peripheral Venipuncture; Blood culture   Result Value Ref Range    Blood Culture No growth at 1 day    Blood Culture    Specimen: Peripheral Venipuncture; Blood culture   Result Value Ref Range    Blood Culture No growth at 1 day    Troponin I, High Sensitivity   Result Value Ref Range    Troponin I, High Sensitivity 30 (H) 0 - 20 ng/L   Urinalysis with Reflex Microscopic   Result Value Ref Range    Color, Urine Yellow Light-Yellow, Yellow, Dark-Yellow    Appearance, Urine Turbid (N) Clear    Specific Gravity, Urine 1.021 1.005 - 1.035    pH, Urine 5.5 5.0, 5.5, 6.0, 6.5, 7.0, 7.5, 8.0    Protein, Urine 20 (TRACE) NEGATIVE, 10 (TRACE), 20 (TRACE) mg/dL    Glucose, Urine Normal Normal mg/dL    Blood, Urine NEGATIVE NEGATIVE    Ketones, Urine NEGATIVE NEGATIVE mg/dL    Bilirubin, Urine NEGATIVE NEGATIVE    Urobilinogen, Urine Normal Normal mg/dL    Nitrite, Urine NEGATIVE NEGATIVE    Leukocyte Esterase, Urine NEGATIVE NEGATIVE   Microscopic Only, Urine   Result Value Ref Range    WBC, Urine 1-5 1-5, NONE /HPF    RBC, Urine NONE NONE, 1-2, 3-5 /HPF    Mucus, Urine FEW Reference range not established. /LPF    Hyaline Casts, Urine OCCASIONAL (A) NONE /LPF    Calcium Oxalate Crystals, Urine 1+ NONE, 1+ /HPF   Troponin I, High Sensitivity   Result Value Ref Range    Troponin I, High Sensitivity 71 (HH) 0 - 20 ng/L   Basic metabolic panel   Result Value Ref Range    Glucose 118 (H) 74 - 99 mg/dL    Sodium 140 136 - 145 mmol/L    Potassium 3.8 3.5 - 5.3 mmol/L    Chloride 107 98 - 107 mmol/L    Bicarbonate 26 21 - 32 mmol/L    Anion Gap  11 10 - 20 mmol/L    Urea Nitrogen 28 (H) 6 - 23 mg/dL    Creatinine 1.85 (H) 0.50 - 1.30 mg/dL    eGFR 36 (L) >60 mL/min/1.73m*2    Calcium 9.4 8.6 - 10.3 mg/dL   CBC   Result Value Ref Range    WBC 7.9 4.4 - 11.3 x10*3/uL    nRBC 0.0 0.0 - 0.0 /100 WBCs    RBC 3.20 (L) 4.50 - 5.90 x10*6/uL    Hemoglobin 9.3 (L) 13.5 - 17.5 g/dL    Hematocrit 28.7 (L) 41.0 - 52.0 %    MCV 90 80 - 100 fL    MCH 29.1 26.0 - 34.0 pg    MCHC 32.4 32.0 - 36.0 g/dL    RDW 13.4 11.5 - 14.5 %    Platelets 100 (L) 150 - 450 x10*3/uL   Troponin I, High Sensitivity   Result Value Ref Range    Troponin I, High Sensitivity 138 (HH) 0 - 20 ng/L                Assessment/Plan        Principal Problem:    Influenza  Dementia  Hypertension  CKD 3  Hyperlipidemia  Coronary artery disease with elevated troponins    Patient's respiratory status is stable he is on room air.  Will continue with a course of Tamiflu.  Elevated troponin noted and discussed with cardiology cardiology consulted no further workup at this time.  Noted input from  and trying to look into acute rehab for discharge disposition          Ean Mathew MD

## 2024-03-29 NOTE — CONSULTS
"Inpatient consult to Cardiology  Consult performed by: Olga Lidia Leggett, APRN-CNP  Consult ordered by: Ean Mathew MD  Reason for consult: elevated troponin        History Of Present Illness:    Maycol Christiansen is a 78 y.o. 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  Cardiology consulted for elevated troponin.     HPI is limited, as patient is confused, but able to state his name.  Denies any specific complaints, and is interested in coffee and eating his breakfast.  Denies any chest pain or angina.  Reported from ED notes that patient has been coughing at home, and did fall after being unsteady  while bending over the sink.  No reported chest pain at home.  EMS notes also states that family told them patient has had a cough and has \"not been feeling well\".     Previously followed with Dr Rankin for cardiology, last visit 10/3/2022    Past Cardiology Tests (Last 3 Years):  EKG:  Results for orders placed during the hospital encounter of 03/28/24    ECG 12 Lead (Preliminary)  This result has not been signed. Information might be incomplete.    Narrative  Suspect arm lead reversal, interpretation assumes no reversal  Sinus rhythm with 1st degree AV block  Right bundle branch block  Septal infarct (cited on or before 28-MAR-2024)  Abnormal ECG  When compared with ECG of 23-APR-2023 17:25,  Previous ECG has undetermined rhythm, needs review  Questionable change in initial forces of Septal leads    Echo:    Echo 10/19: EF 50-55%, DD, AsAo 3.9cm  Echo 10/20: TDS, EF 50-60%, DD, AoR 4.3cm, AsAo 4.0cm  Cath:    Cath 9/19: dLAD 60%, D3 60%, pRCA 30%     Stress Test:    Nuc 4/16: no ischemia/scar, EF 52%     Cardiac Imaging:  No results found for this or any previous visit.      Past Medical " History:  He has a past medical history of Acute kidney failure (CMS/[facility]), Anxiety, CKD (chronic kidney disease), Cognitive communication deficit, Dementia (CMS/[facility]), Dysphagia, GERD (gastroesophageal reflux disease), Malaise, Osteoarthritis, Other cerebral infarction due to occlusion or stenosis of small artery (CMS/HCC) (12/11/2018), Personal history of other diseases of the circulatory system (02/16/2015), Personal history of other endocrine, nutritional and metabolic disease (11/04/2015), Personal history of other mental and behavioral disorders (10/08/2020), Personal history of transient ischemic attack (TIA), and cerebral infarction without residual deficits (07/31/2017), Pneumonia, and Weakness.    Past Surgical History:  He has a past surgical history that includes Hand surgery (04/08/2013); Knee surgery (04/08/2013); Other surgical history (01/24/2022); Other surgical history (01/24/2022); Other surgical history (11/04/2015); MR angio head wo IV contrast (4/5/2015); MR angio neck wo IV contrast (4/5/2015); MR angio head wo IV contrast (1/25/2017); MR angio neck wo IV contrast (1/25/2017); MR angio head wo IV contrast (4/24/2023); and MR angio neck wo IV contrast (4/24/2023).      Social History:  He reports that he has quit smoking. His smoking use included cigarettes. He has never used smokeless tobacco. He reports current alcohol use. No history on file for drug use.    Family History:  Family History   Problem Relation Name Age of Onset    No Known Problems Mother      No Known Problems Father          Allergies:  Codeine, Sulfa (sulfonamide antibiotics), Ciprofloxacin, Latex, Penicillins, Sulfamethoxazole, and Wool    ROS:  10 point review of systems including (Constitutional, Eyes, ENMT, Respiratory, Cardiac, Gastrointestinal, Neurological, Psychiatric, and Hematologic) was performed and is otherwise negative.    Objective Data:  Last Recorded Vitals:  Vitals:    03/28/24 1415 03/28/24 1430 03/28/24  "1624 24 1711   BP: 155/72 154/68 158/75 168/70   BP Location:   Left arm Right arm   Patient Position:   Lying Lying   Pulse: 76 73 74 75   Resp: 17 (!) 24 (!) 23 19   Temp:   37.9 °C (100.2 °F) 38.3 °C (100.9 °F)   TempSrc:   Temporal Axillary   SpO2:   (!) 92% 92%   Weight:       Height:         Medical Gas Therapy: None (Room air)  Weight  Av.5 kg (144 lb 8 oz)  Min: 63.5 kg (140 lb)  Max: 67.6 kg (149 lb)      LABS:  CMP:  Results from last 7 days   Lab Units 24  04424  0345   SODIUM mmol/L 140 139   POTASSIUM mmol/L 3.8 3.7   CHLORIDE mmol/L 107 104   CO2 mmol/L 26 28   ANION GAP mmol/L 11 11   BUN mg/dL 28* 24*   CREATININE mg/dL 1.85* 1.82*   EGFR mL/min/1.73m*2 36* 37*   MAGNESIUM mg/dL  --  1.70   ALBUMIN g/dL  --  4.2   ALT U/L  --  18   AST U/L  --  27   BILIRUBIN TOTAL mg/dL  --  1.6*     CBC:  Results from last 7 days   Lab Units 24  04424  0345   WBC AUTO x10*3/uL 7.9 6.2   HEMOGLOBIN g/dL 9.3* 10.8*   HEMATOCRIT % 28.7* 33.9*   PLATELETS AUTO x10*3/uL 100* 114*   MCV fL 90 89     COAG:     ABO: No results found for: \"ABO\"  HEME/ENDO:     CARDIAC:   Results from last 7 days   Lab Units 24  1632 24  0719 24  0345   TROPHS ng/L 71* 30* 25*             Last I/O:    Intake/Output Summary (Last 24 hours) at 3/29/2024 0745  Last data filed at 3/28/2024 1801  Gross per 24 hour   Intake 65 ml   Output --   Net 65 ml     Net IO Since Admission: 65 mL [24 0745]      Imaging Results:  ECG 12 Lead    Result Date: 3/28/2024  Suspect arm lead reversal, interpretation assumes no reversal Sinus rhythm with 1st degree AV block Right bundle branch block Septal infarct (cited on or before 28-MAR-2024) Abnormal ECG When compared with ECG of 2023 17:25, Previous ECG has undetermined rhythm, needs review Questionable change in initial forces of Septal leads    CT head wo IV contrast    Result Date: 3/28/2024  Interpreted By:  René Bernal, STUDY: CT " HEAD WO IV CONTRAST;  3/28/2024 4:12 am   INDICATION: Signs/Symptoms:fall, ams   COMPARISON: 04/2020   ACCESSION NUMBER(S): [id number]   ORDERING CLINICIAN: CAMMIE PIPER   TECHNIQUE: Axial noncontrast CT images of head with coronal and sagittal reconstructed images. Axial noncontrast CT images of the cervical spine with coronal and sagittal reconstructed images.   FINDINGS: CT head:   Global volume loss. Left occipital encephalomalacia again seen moderate chronic small vessel ischemic change. No evidence of acute hemorrhage or edema cavum septum is seen   CT CERVICAL SPINE: Bulky anterior posterior osteophytes with notable spinal stenosis seen centrally at C3-4 C5-6 and C6-7. Vascular calcification. No findings of acute cervical spine fracture.       CT HEAD: No acute intracranial abnormality or calvarial fracture. Stable appearance when compared to prior with evidence of prior ischemic change. No acute hemorrhage   CT CERVICAL SPINE: No acute fracture or traumatic malalignment of the cervical spine. Multilevel degenerative disc disease.   Signed by: René Bernal 3/28/2024 5:01 AM Dictation workstation:   [id number]    CT cervical spine wo IV contrast    Result Date: 3/28/2024  Interpreted By:  René Bernal, STUDY: CT HEAD WO IV CONTRAST;  3/28/2024 4:12 am   INDICATION: Signs/Symptoms:fall, ams   COMPARISON: 04/2020   ACCESSION NUMBER(S): [id number]   ORDERING CLINICIAN: CAMMIE PIPER   TECHNIQUE: Axial noncontrast CT images of head with coronal and sagittal reconstructed images. Axial noncontrast CT images of the cervical spine with coronal and sagittal reconstructed images.   FINDINGS: CT head:   Global volume loss. Left occipital encephalomalacia again seen moderate chronic small vessel ischemic change. No evidence of acute hemorrhage or edema cavum septum is seen   CT CERVICAL SPINE: Bulky anterior posterior osteophytes with notable spinal stenosis seen centrally at C3-4 C5-6 and C6-7. Vascular  calcification. No findings of acute cervical spine fracture.       CT HEAD: No acute intracranial abnormality or calvarial fracture. Stable appearance when compared to prior with evidence of prior ischemic change. No acute hemorrhage   CT CERVICAL SPINE: No acute fracture or traumatic malalignment of the cervical spine. Multilevel degenerative disc disease.   Signed by: René Bernal 3/28/2024 5:01 AM Dictation workstation:   [id number]    XR chest 1 view    Result Date: 3/28/2024  STUDY: Chest Radiograph; 03/28/2024 4:01 AM INDICATION: Cough; pneumonia suspected. COMPARISON: XR chest 04/25/2023, 04/23/2023. ACCESSION NUMBER(S): [id number] ORDERING CLINICIAN: CAMMIE PIPER TECHNIQUE:  Frontal chest was obtained at 03:59:00 hours. FINDINGS: CARDIOMEDIASTINAL SILHOUETTE: Cardiomediastinal silhouette is enlarged.  The aorta is slightly tortuous. LUNGS: Lungs are clear.  Eventration of right hemidiaphragm is demonstrated.  ABDOMEN: No remarkable upper abdominal findings.  BONES: No acute osseous changes.    No change since previous exam.  No acute process.. Signed by rAt Pérez DO      Inpatient Medications:  Scheduled medications   Medication Dose Route Frequency    ALPRAZolam  1 mg oral Nightly    calcitriol  0.25 mcg oral Daily    clopidogrel  75 mg oral Nightly    docusate sodium  100 mg oral BID    enoxaparin  30 mg subcutaneous q24h    levothyroxine  150 mcg oral Daily    melatonin  9 mg oral Nightly    metoprolol succinate XL  25 mg oral Daily    mirtazapine  30 mg oral Nightly    multivitamin with minerals  1 tablet oral Daily    oseltamivir  30 mg oral q24h    polyethylene glycol  17 g oral Daily    rosuvastatin  5 mg oral Nightly     PRN medications   Medication    ondansetron    Or    ondansetron     Continuous Medications   Medication Dose Last Rate    sodium chloride 0.9%  75 mL/hr 75 mL/hr (03/28/24 1801)       Outpatient Medications:  Current Outpatient Medications   Medication  Instructions    ALPRAZolam (XANAX) 1 mg, oral, Nightly    calcitriol (ROCALTROL) 0.25 mcg, oral, Daily    calcitriol (ROCALTROL) 0.25 mcg, oral, 2 times daily, Only on Saturday and Sunday    clopidogrel (Plavix) 75 mg tablet Take 1 Tab Daily    docusate sodium (COLACE) 100 mg, oral, 2 times daily    levothyroxine (SYNTHROID, LEVOXYL) 150 mcg, oral, Daily    melatonin 10 mg tablet oral    metoprolol succinate XL (TOPROL-XL) 25 mg, oral, Daily    mirtazapine (REMERON) 30 mg, oral, Nightly    multivit-iron-minerals-folic acid (Centrum Silver) 0.4 mg-300 mcg- 250 mcg tab 1 tablet, oral, Daily    oseltamivir (TAMIFLU) 30 mg, oral, Every 12 hours    rosuvastatin (CRESTOR) 5 mg, oral, Nightly       Physical Exam:    General:  Patient is awake, alert, and pleasantly confused.  Patient is in no acute distress, eating breakfast on room air  HEENT:  Normocephalic.  Moist mucosa.    Neck:  Normal Jugular Venous Pressure.  Cardiovascular:  Regular rate and rhythm.  Normal S1 and S2, no murmurs, rubs or gallops  Pulmonary:  Clear to auscultation bilaterally.  Abdomen:  Soft. Non-tender.   Non-distended.  Positive bowel sounds.  Lower Extremities:  2+ pedal pulses. No LE edema.  Neurologic:  Alert and very pleasantly confused.  No focal deficit.   Skin: Skin warm and dry, normal skin turgor.   Psychiatric: Normal affect.       Assessment/Plan   Maycol Christiansen is a 78 y.o. male with past medical history of mild-mod CAD on LHC 2019, LVEF 50-60% on echo  2020, AsAo aneurysm, athero of Ao, HTN, HLD, CKD, CVA '17 (resid R weak), hypothyroidism, dementia who [place] [facility] [place] a [place] and flu like symptoms, found to be influenza A positive.  Cardiology consulted for elevated troponin.     3/28 > Flu a positive. CBC shows normocytic anemia similar to patient's baseline. CMP shows normal electrolytes with creatinine 1.2 which is improvement from prior. Mildly elevated total bilirubin at 1.6 although patient did not complain of any  abdominal pain and otherwise LFTs are normal. RSV and lactate normal. Magnesium normal. Troponin elevated at 25, 30, 71, 138.  Hemodynamically stable, on room air. There are no significant clinical signs / symptoms or ischemic changes consistent with ACS.    Assessment:  # Influenza A infection  # Dementia  # Hypertension  # Elevated troponin.  Low level elevation with a flat trend consistent with a non-MI troponin elevation / chronic non-traumatic myocardial injury in the setting of above. Core measures do not apply.    Plan:  - Patient is completely asymptomatic from a cardiovascular standpoint.  No further troponin trend is necessary, no further inpatient cardiac testing is warranted.    - Continue with home medications as previously prescribed.     He can follow up with his established cardiologist, Dr. Rankin, within 2-3 weeks of hospital discharge, once recovered from the flu.      Cardiology will sign off at this time, please call again with any questions    Code Status:  Full Code    Olga Lidia Leggett, APRN-CNP

## 2024-03-29 NOTE — PROGRESS NOTES
03/29/24 1401   Discharge Planning   Living Arrangements Spouse/significant other   Support Systems Spouse/significant other;Children   Assistance Needed walker   Type of Residence Private residence   Number of Stairs to Enter Residence 0   Number of Stairs Within Residence 0   Patient expects to be discharged to: HHC vs SNF   Patient Choice   Provider Choice list and CMS website (https://medicare.gov/care-compare#search) for post-acute Quality and Resource Measure Data were provided and reviewed with: Family     Spoke with patient's wife Ban--she can only accept patient back to the house if he is able to walk.  She is worried that he is going to fall and she won't be able to pick him up.  She does not have anyone to help her during the day as their son works.  PT/OT recommend MOD but patient has traditional medicare and is under obs status so he is not eligible for SNF placement.  Spoke with ULISES Schaefer and she does not think he will be upgraded.  Sent a SNF and HHC list to patient's wife at [email or web address].  Explained private pay aides and private pay SNF but wife did not think they would be able to pay privately.  Asked PRABHA Martinez to assist with this patient as there are not too many options we can provide.

## 2024-03-29 NOTE — CARE PLAN
Pt has influenza -  is  weak and wife reports being unsure  about bringing  pt  home  due to  concern  for falling.     Pt is OBS with  trad Medicare/does not  qualify  for SNF    Referral  sent  to [facility]; [place] rehab (declined  due to  no rehab  dx);    [facility]  rehab;  [facility]  rehab.       SW  did call a  local liaison to discuss  respite  but  due to pt having the flu  he  would need to be  in a  setting  with  isolation  precautions.    [facility]  facilities are  reviewing  currently.     MO Hogue, LSW              3-29-24 [id number]  Awaiting  PT  consult    SW called wife. Wife unsure of plan if  [facility] cannot  accept - she said it  would not be safe/doable for her and her  son  to  help pt  transfer.     Wife  emailed moon letter  and info  on resources in case  she  wants to start looking  into  AL/memory care, etc.     Michelle Medina, MO, LSW

## 2024-03-29 NOTE — CARE PLAN
Problem: Pain  Goal: My pain/discomfort is manageable  Outcome: Progressing     Problem: Safety  Goal: Patient will be injury free during hospitalization  Outcome: Progressing  Goal: I will remain free of falls  Outcome: Progressing     Problem: Daily Care  Goal: Daily care needs are met  Outcome: Progressing     Problem: Psychosocial Needs  Goal: Demonstrates ability to cope with hospitalization/illness  Outcome: Progressing  Goal: Collaborate with me, my family, and caregiver to identify my specific goals  Outcome: Progressing     Problem: Discharge Barriers  Goal: My discharge needs are met  Outcome: Progressing     Problem: Skin  Goal: Participates in plan/prevention/treatment measures  Outcome: Progressing  Flowsheets (Taken 3/29/2024 0754)  Participates in plan/prevention/treatment measures: Elevate heels  Goal: Prevent/manage excess moisture  Outcome: Progressing  Flowsheets (Taken 3/29/2024 0754)  Prevent/manage excess moisture: Moisturize dry skin   The patient's goals for the shift include Remain free from falls    The clinical goals for the shift include free from falls and injury

## 2024-03-29 NOTE — PROGRESS NOTES
Occupational Therapy    Evaluation/Treatment    Patient Name: Maycol Christiansen  MRN: [id number]  : 1945  Today's Date: 24  Time Calculation  Start Time: 1131  Stop Time: 1155  Time Calculation (min): 24 min     Assessment:  OT Assessment: 79 yo presenting with decline from baseline due to generalized weakness, complicated by baseline dementia. Inc assist for transfers, mobility and ADL management, will follow per POC  Prognosis: Good  Barriers to Discharge: Decreased caregiver support  Evaluation/Treatment Tolerance: Patient tolerated treatment well  Medical Staff Made Aware: Yes  End of Session Communication: Bedside nurse  End of Session Patient Position: Bed, 3 rail up, Alarm on  OT Assessment Results: Decreased ADL status, Decreased cognition, Decreased endurance, Decreased functional mobility, Decreased gross motor control, Decreased safe judgment during ADL  Prognosis: Good  Barriers to Discharge: Decreased caregiver support  Evaluation/Treatment Tolerance: Patient tolerated treatment well  Medical Staff Made Aware: Yes  Strengths: Premorbid level of function  Plan:  Treatment Interventions: ADL retraining, Functional transfer training, UE strengthening/ROM, Cognitive reorientation, Equipment evaluation/education, Fine motor coordination activities, Compensatory technique education  Treatment Interventions: ADL retraining, Functional transfer training, UE strengthening/ROM, Cognitive reorientation, Equipment evaluation/education, Fine motor coordination activities, Compensatory technique education    Subjective   Current Problem:  1. Influenza A  oseltamivir (Tamiflu) 30 mg capsule        General:   OT Received On: 24  General  Reason for Referral: decline in ADLs; 79 yo with PMHx dementia presenting to ED due to cough + malaise and near fall at home. 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    Personal history of other diseases of the circulatory system 02/16/2015    History of orthostatic hypotension    Personal history of other endocrine, nutritional and metabolic disease 11/04/2015    History of hypothyroidism    Personal history of other mental and behavioral disorders 10/08/2020    History of dementia    Personal history of transient ischemic attack (TIA), and cerebral infarction without residual deficits 07/31/2017    History of stroke    Pneumonia     Weakness      Family/Caregiver Present: No  Prior to Session Communication: Bedside nurse  Patient Position Received: Bed, 3 rail up, Alarm on  Preferred Learning Style: visual, verbal, kinesthetic  General Comment: 79 yo supine in bed on arrival, pleasanty confused, able to participate in OT eval  Precautions:  Medical Precautions: Fall precautions (droplet precautions +FLU)    Pain:  Pain Assessment  Pain Assessment: 0-10  Pain Score: 0 - No pain    Objective   Cognition:  Overall Cognitive Status: Impaired, Impaired at baseline  Arousal/Alertness: Delayed responses to stimuli  Orientation Level: Disoriented to place, Disoriented to time, Disoriented to situation (oriented to self)  Following Commands: Follows one step commands with increased time  Problem Solving: Assistance required to identify errors made  Insight: Moderate (poor safety awarenss and insight, decr. ability to self correct; unaware of potential safety hazards or LOB experienced during transfers and mobility)     Home Living:  Type of Home: House  Lives With: Spouse  Home Layout: One level  Home Access:  (unclear if there are stairs; pt is unable to provide history or PLOF  information)  Home Living Comments: anticipate pt had assist for all IADLs; pt was ambulatory  without assist prior to illness; using walker? Anticipate some assist for bathing tasks due to cognitive impairments though functionally able to perform transfers and mobiltiy    IADL History:  Homemaking Responsibilities: No  IADL Comments: family does all IADLs  ADL:  LE Dressing Assistance: Maximal  LE Dressing Deficit: Don/doff R sock, Don/doff L sock, Thread RLE into underwear, Thread LLE into underwear  Toileting Assistance with Device:  (incontient and  dressed in brief; max assist distal LB ADLs, decr/ sitting balance)  Functional Assistance:  (inc assist for all ADLs as pt not able to safely manage OOB transfers independently due to balance/safety deficits)    Activity Tolerance:  Endurance: Endurance does not limit participation in activity  Functional Standing Tolerance: decr dynamic standing safety, mild balance impairments when pt releases  from walker to scratch back. Poor hand safety awareness on walker during ambulation     Bed Mobility/Transfers: Bed Mobility  Bed Mobility: Yes  Bed Mobility 1  Bed Mobility 1: Supine to sitting, Sitting to supine  Level of Assistance 1: Minimum assistance  Bed Mobility Comments 1: hand over hand to guide hands to bed rail; min trunk support  Bed Mobility 2  Bed Mobility  2: Scooting  Level of Assistance 2: Contact guard, Maximum verbal cues (scoot hips to EOB)    Transfers  Transfer: Yes  Transfer 1  Technique 1: Sit to stand  Transfer Device 1: Walker  Transfer Level of Assistance 1: Moderate verbal cues, Moderate tactile cues, Minimum assistance  Trials/Comments 1: pt initially retropulsive during sit to stand requiring min ax1  Transfers 2  Technique 2: Stand to sit  Transfer Device 2: Walker  Transfer Level of Assistance 2: Contact guard, Maximum verbal cues, Moderate tactile cues  Trials/Comments 2: cues for hand placement    Functional Mobility:  Functional  Mobility  Functional Mobility Performed: Yes  Functional Mobility 1  Device 1: Rolling walker  Functional Mobility Support Devices: Gait belt  Assistance 1: Minimum assistance  Comments 1: pt requires inc assist for ambulation with use of ww; mild balance deficits and max v/t cues for positioning of ww and hand placement; ambulated ~10 ft in room  Sitting Balance:  Static Sitting Balance  Static Sitting-Level of Assistance: Close supervision  Dynamic Sitting Balance  Dynamic Sitting-Balance:  (CGA)  Standing Balance:  Static Standing Balance  Static Standing-Level of Assistance:  (CGA)  Dynamic Standing Balance  Dynamic Standing-Balance:  (min assist x1)    Strength:  Strength Comments: generalized weakness/deconditioning    Coordination:  Movements are Fluid and Coordinated: Yes   Hand Function:  Hand Function  Gross Grasp: Functional  Extremities: RUE   RUE : Within Functional Limits, LUE   LUE: Within Functional Limits (UE's grossly WFLs for mobility/ADL management), RLE   RLE : Within Functional Limits, and LLE   LLE : Within Functional Limits (appears to have slight Bilateral knee flexor contractures, unable to fully extend)    Outcome Measures:   [facility] Daily Activity  Putting on and taking off regular lower body clothing: A lot  Bathing (including washing, rinsing, drying): A lot  Putting on and taking off regular upper body clothing: A little  Toileting, which includes using toilet, bedpan or urinal: A lot  Taking care of personal grooming such as brushing teeth: A little  Eating Meals: A little  Daily Activity - Total Score: 15        Education Documentation  ADL Training, taught by Jurgen Kebede, OT at 3/29/2024  3:51 PM.  Learner: Patient  Readiness: Acceptance  Method: Explanation  Response: No Evidence of Learning    Education Comments  No comments found.      Goals:  Encounter Problems       Encounter Problems (Active)       ADLs       Patient will perform UB and LB seated sponge bathing  with set-up  level of assistance and min verbal cues.       Start:  03/29/24            Patient with complete lower body dressing with modified independent level of assistance donning and doffing all LE clothes with min verbal cues while edge of bed        Start:  03/29/24            Patient will feed self with modified independent level of assistance and verbal cues using PRN adaptive equipment.       Start:  03/29/24            Patient will complete daily grooming tasks  with supervision level of assistance and PRN adaptive equipment while standing.       Start:  03/29/24            Patient will complete toileting including hygiene clothing management/hygiene with stand by assist level of assistance and raised toilet seat and grab bars.       Start:  03/29/24               BALANCE       Pt will maintain dynamic standing balance during ADL task with supervision level of assistance in order to demonstrate decreased risk of falling and improved postural control.       Start:  03/29/24               COGNITION/SAFETY       Pt will follow Simple one step commands 100% of the time during OT tx session.       Start:  03/29/24               MOBILITY       Patient will perform Functional mobility  Household distances/Community Distances with modified independent level of assistance and least restrictive device in order to improve safety and functional mobility.       Start:  03/29/24               TRANSFERS       Patient will perform bed mobility modified independent level of assistance and bed rails in order to improve safety and independence with mobility       Start:  03/29/24            Patient will complete sit to stand transfer with modified independent level of assistance and least restrictive device in order to improve safety and prepare for out of bed mobility.       Start:  03/29/24

## 2024-03-30 VITALS
DIASTOLIC BLOOD PRESSURE: 77 MMHG | TEMPERATURE: 99 F | HEIGHT: 69 IN | WEIGHT: 140 LBS | BODY MASS INDEX: 20.73 KG/M2 | OXYGEN SATURATION: 94 % | SYSTOLIC BLOOD PRESSURE: 144 MMHG | HEART RATE: 85 BPM | RESPIRATION RATE: 18 BRPM

## 2024-03-30 LAB
ANION GAP SERPL CALC-SCNC: 14 MMOL/L (ref 10–20)
BUN SERPL-MCNC: 30 MG/DL (ref 6–23)
CALCIUM SERPL-MCNC: 10.3 MG/DL (ref 8.6–10.3)
CHLORIDE SERPL-SCNC: 105 MMOL/L (ref 98–107)
CO2 SERPL-SCNC: 27 MMOL/L (ref 21–32)
CREAT SERPL-MCNC: 1.68 MG/DL (ref 0.5–1.3)
EGFRCR SERPLBLD CKD-EPI 2021: 41 ML/MIN/1.73M*2
ERYTHROCYTE [DISTWIDTH] IN BLOOD BY AUTOMATED COUNT: 13.2 % (ref 11.5–14.5)
GLUCOSE SERPL-MCNC: 96 MG/DL (ref 74–99)
HCT VFR BLD AUTO: 35 % (ref 41–52)
HGB BLD-MCNC: 11.2 G/DL (ref 13.5–17.5)
MCH RBC QN AUTO: 28.5 PG (ref 26–34)
MCHC RBC AUTO-ENTMCNC: 32 G/DL (ref 32–36)
MCV RBC AUTO: 89 FL (ref 80–100)
NRBC BLD-RTO: 0 /100 WBCS (ref 0–0)
PLATELET # BLD AUTO: 114 X10*3/UL (ref 150–450)
POTASSIUM SERPL-SCNC: 3.6 MMOL/L (ref 3.5–5.3)
RBC # BLD AUTO: 3.93 X10*6/UL (ref 4.5–5.9)
SODIUM SERPL-SCNC: 142 MMOL/L (ref 136–145)
WBC # BLD AUTO: 7.4 X10*3/UL (ref 4.4–11.3)

## 2024-03-30 PROCEDURE — 2500000004 HC RX 250 GENERAL PHARMACY W/ HCPCS (ALT 636 FOR OP/ED): Performed by: NURSE PRACTITIONER

## 2024-03-30 PROCEDURE — 36415 COLL VENOUS BLD VENIPUNCTURE: CPT | Performed by: NURSE PRACTITIONER

## 2024-03-30 PROCEDURE — 2500000001 HC RX 250 WO HCPCS SELF ADMINISTERED DRUGS (ALT 637 FOR MEDICARE OP): Performed by: FAMILY MEDICINE

## 2024-03-30 PROCEDURE — 97161 PT EVAL LOW COMPLEX 20 MIN: CPT | Mod: GP

## 2024-03-30 PROCEDURE — G0378 HOSPITAL OBSERVATION PER HR: HCPCS

## 2024-03-30 PROCEDURE — 85027 COMPLETE CBC AUTOMATED: CPT | Performed by: NURSE PRACTITIONER

## 2024-03-30 PROCEDURE — 2500000001 HC RX 250 WO HCPCS SELF ADMINISTERED DRUGS (ALT 637 FOR MEDICARE OP): Performed by: PEDIATRICS

## 2024-03-30 PROCEDURE — 96372 THER/PROPH/DIAG INJ SC/IM: CPT | Performed by: NURSE PRACTITIONER

## 2024-03-30 PROCEDURE — 80048 BASIC METABOLIC PNL TOTAL CA: CPT | Performed by: NURSE PRACTITIONER

## 2024-03-30 PROCEDURE — 2500000002 HC RX 250 W HCPCS SELF ADMINISTERED DRUGS (ALT 637 FOR MEDICARE OP, ALT 636 FOR OP/ED): Performed by: NURSE PRACTITIONER

## 2024-03-30 RX ORDER — CALCITRIOL 0.25 UG/1
0.25 CAPSULE ORAL DAILY
COMMUNITY
Start: 2024-03-30 | End: 2024-06-03 | Stop reason: SDUPTHER

## 2024-03-30 RX ORDER — IPRATROPIUM BROMIDE AND ALBUTEROL SULFATE 2.5; .5 MG/3ML; MG/3ML
3 SOLUTION RESPIRATORY (INHALATION) EVERY 2 HOUR PRN
Qty: 180 ML | Refills: 11 | Status: SHIPPED | OUTPATIENT
Start: 2024-03-30

## 2024-03-30 RX ORDER — OSELTAMIVIR PHOSPHATE 30 MG/1
30 CAPSULE ORAL EVERY 24 HOURS
Qty: 2 CAPSULE | Refills: 0 | Status: SHIPPED | OUTPATIENT
Start: 2024-03-31 | End: 2024-04-02

## 2024-03-30 RX ADMIN — OSELTAMAVIR PHOSPHATE 30 MG: 30 CAPSULE ORAL at 06:57

## 2024-03-30 RX ADMIN — DOCUSATE SODIUM 100 MG: 100 CAPSULE, LIQUID FILLED ORAL at 08:31

## 2024-03-30 RX ADMIN — LEVOTHYROXINE SODIUM 150 MCG: 75 TABLET ORAL at 08:31

## 2024-03-30 RX ADMIN — CALCITRIOL CAPSULES 0.25 MCG 0.25 MCG: 0.25 CAPSULE ORAL at 08:31

## 2024-03-30 RX ADMIN — POLYETHYLENE GLYCOL 3350 17 G: 17 POWDER, FOR SOLUTION ORAL at 08:39

## 2024-03-30 RX ADMIN — Medication 1 TABLET: at 08:31

## 2024-03-30 RX ADMIN — METOPROLOL SUCCINATE 25 MG: 25 TABLET, EXTENDED RELEASE ORAL at 08:32

## 2024-03-30 RX ADMIN — ENOXAPARIN SODIUM 30 MG: 30 INJECTION SUBCUTANEOUS at 15:20

## 2024-03-30 ASSESSMENT — COGNITIVE AND FUNCTIONAL STATUS - GENERAL
DRESSING REGULAR LOWER BODY CLOTHING: A LOT
TURNING FROM BACK TO SIDE WHILE IN FLAT BAD: A LITTLE
WALKING IN HOSPITAL ROOM: A LOT
TOILETING: A LOT
CLIMB 3 TO 5 STEPS WITH RAILING: A LOT
MOBILITY SCORE: 16
STANDING UP FROM CHAIR USING ARMS: A LITTLE
CLIMB 3 TO 5 STEPS WITH RAILING: TOTAL
STANDING UP FROM CHAIR USING ARMS: A LOT
PERSONAL GROOMING: A LITTLE
DRESSING REGULAR UPPER BODY CLOTHING: A LITTLE
TURNING FROM BACK TO SIDE WHILE IN FLAT BAD: A LITTLE
MOBILITY SCORE: 13
MOVING TO AND FROM BED TO CHAIR: A LITTLE
MOVING FROM LYING ON BACK TO SITTING ON SIDE OF FLAT BED WITH BEDRAILS: A LITTLE
EATING MEALS: A LITTLE
MOVING FROM LYING ON BACK TO SITTING ON SIDE OF FLAT BED WITH BEDRAILS: A LITTLE
HELP NEEDED FOR BATHING: A LOT
MOVING TO AND FROM BED TO CHAIR: A LOT
WALKING IN HOSPITAL ROOM: A LOT
DAILY ACTIVITIY SCORE: 15

## 2024-03-30 ASSESSMENT — PAIN SCALES - GENERAL
PAINLEVEL_OUTOF10: 0 - NO PAIN
PAINLEVEL_OUTOF10: 0 - NO PAIN

## 2024-03-30 ASSESSMENT — PAIN - FUNCTIONAL ASSESSMENT: PAIN_FUNCTIONAL_ASSESSMENT: 0-10

## 2024-03-30 NOTE — PROGRESS NOTES
Maycol Christiansen is a 78 y.o. male on day 0 of admission presenting with Influenza.      Subjective   Pt seen this morning he is feeling ok  Confused however appears better then yesterdya   No c/o pain        Objective     Last Recorded Vitals  /78   Pulse 78   Temp 37 °C (98.6 °F) (Tympanic)   Resp 17   Wt 63.5 kg (140 lb)   SpO2 97%   Intake/Output last 3 Shifts:  No intake or output data in the 24 hours ending 03/30/24 0924      Admission Weight  Weight: 67.6 kg (149 lb) (03/28/24 0242)    Daily Weight  03/28/24 : 63.5 kg (140 lb)    Image Results      Physical Exam  Patient is well-developed well-nourished  male he is alert he is confused  Head eye ENT exam shows pupils are equal reactive extraocular movement intact.  Chest is clear to auscultation.  CVS exam shows heart sounds are regular.  Abdomen is soft bowel sounds are active no organomegaly or masses palpable.  Extremities there is no edema  CNS patient is confused he is able to move extremities  Relevant Results  Scheduled medications  ALPRAZolam, 1 mg, oral, Nightly  calcitriol, 0.25 mcg, oral, Daily  clopidogrel, 75 mg, oral, Nightly  docusate sodium, 100 mg, oral, BID  enoxaparin, 30 mg, subcutaneous, q24h  levothyroxine, 150 mcg, oral, Daily  melatonin, 9 mg, oral, Nightly  metoprolol succinate XL, 25 mg, oral, Daily  mirtazapine, 30 mg, oral, Nightly  multivitamin with minerals, 1 tablet, oral, Daily  oseltamivir, 30 mg, oral, q24h  polyethylene glycol, 17 g, oral, Daily  rosuvastatin, 5 mg, oral, Nightly      Continuous medications     PRN medications  PRN medications: ipratropium-albuteroL, ondansetron **OR** ondansetron  Results for orders placed or performed during the hospital encounter of 03/28/24 (from the past 96 hour(s))   Sars-CoV-2 and Influenza A/B PCR   Result Value Ref Range    Flu A Result Detected (A) Not Detected    Flu B Result Not Detected Not Detected    Coronavirus 2019, PCR Not Detected Not Detected   RSV PCR    Result Value Ref Range    RSV PCR Not Detected Not Detected   ECG 12 Lead   Result Value Ref Range    Ventricular Rate 80 BPM    Atrial Rate 80 BPM    MO Interval 252 ms    QRS Duration 168 ms    QT Interval 410 ms    QTC Calculation(Bazett) 472 ms    P Axis 91 degrees    R Axis 266 degrees    T Axis 46 degrees    QRS Count 13 beats    Q Onset 206 ms    P Onset 80 ms    P Offset 149 ms    T Offset 411 ms    QTC Fredericia 451 ms   CBC and Auto Differential   Result Value Ref Range    WBC 6.2 4.4 - 11.3 x10*3/uL    nRBC 0.0 0.0 - 0.0 /100 WBCs    RBC 3.82 (L) 4.50 - 5.90 x10*6/uL    Hemoglobin 10.8 (L) 13.5 - 17.5 g/dL    Hematocrit 33.9 (L) 41.0 - 52.0 %    MCV 89 80 - 100 fL    MCH 28.3 26.0 - 34.0 pg    MCHC 31.9 (L) 32.0 - 36.0 g/dL    RDW 13.5 11.5 - 14.5 %    Platelets 114 (L) 150 - 450 x10*3/uL    Neutrophils % 83.8 40.0 - 80.0 %    Immature Granulocytes %, Automated 0.3 0.0 - 0.9 %    Lymphocytes % 6.1 13.0 - 44.0 %    Monocytes % 9.1 2.0 - 10.0 %    Eosinophils % 0.5 0.0 - 6.0 %    Basophils % 0.2 0.0 - 2.0 %    Neutrophils Absolute 5.23 1.60 - 5.50 x10*3/uL    Immature Granulocytes Absolute, Automated 0.02 0.00 - 0.50 x10*3/uL    Lymphocytes Absolute 0.38 (L) 0.80 - 3.00 x10*3/uL    Monocytes Absolute 0.57 0.05 - 0.80 x10*3/uL    Eosinophils Absolute 0.03 0.00 - 0.40 x10*3/uL    Basophils Absolute 0.01 0.00 - 0.10 x10*3/uL   Magnesium   Result Value Ref Range    Magnesium 1.70 1.60 - 2.40 mg/dL   Comprehensive Metabolic Panel   Result Value Ref Range    Glucose 115 (H) 74 - 99 mg/dL    Sodium 139 136 - 145 mmol/L    Potassium 3.7 3.5 - 5.3 mmol/L    Chloride 104 98 - 107 mmol/L    Bicarbonate 28 21 - 32 mmol/L    Anion Gap 11 10 - 20 mmol/L    Urea Nitrogen 24 (H) 6 - 23 mg/dL    Creatinine 1.82 (H) 0.50 - 1.30 mg/dL    eGFR 37 (L) >60 mL/min/1.73m*2    Calcium 10.0 8.6 - 10.3 mg/dL    Albumin 4.2 3.4 - 5.0 g/dL    Alkaline Phosphatase 52 33 - 136 U/L    Total Protein 6.6 6.4 - 8.2 g/dL    AST 27 9 - 39  U/L    Bilirubin, Total 1.6 (H) 0.0 - 1.2 mg/dL    ALT 18 10 - 52 U/L   Lactate   Result Value Ref Range    Lactate 1.0 0.4 - 2.0 mmol/L   Troponin I, High Sensitivity, Initial   Result Value Ref Range    Troponin I, High Sensitivity 25 (H) 0 - 20 ng/L   Blood Culture    Specimen: Peripheral Venipuncture; Blood culture   Result Value Ref Range    Blood Culture No growth at 1 day    Blood Culture    Specimen: Peripheral Venipuncture; Blood culture   Result Value Ref Range    Blood Culture No growth at 1 day    Troponin I, High Sensitivity   Result Value Ref Range    Troponin I, High Sensitivity 30 (H) 0 - 20 ng/L   Urinalysis with Reflex Microscopic   Result Value Ref Range    Color, Urine Yellow Light-Yellow, Yellow, Dark-Yellow    Appearance, Urine Turbid (N) Clear    Specific Gravity, Urine 1.021 1.005 - 1.035    pH, Urine 5.5 5.0, 5.5, 6.0, 6.5, 7.0, 7.5, 8.0    Protein, Urine 20 (TRACE) NEGATIVE, 10 (TRACE), 20 (TRACE) mg/dL    Glucose, Urine Normal Normal mg/dL    Blood, Urine NEGATIVE NEGATIVE    Ketones, Urine NEGATIVE NEGATIVE mg/dL    Bilirubin, Urine NEGATIVE NEGATIVE    Urobilinogen, Urine Normal Normal mg/dL    Nitrite, Urine NEGATIVE NEGATIVE    Leukocyte Esterase, Urine NEGATIVE NEGATIVE   Microscopic Only, Urine   Result Value Ref Range    WBC, Urine 1-5 1-5, NONE /HPF    RBC, Urine NONE NONE, 1-2, 3-5 /HPF    Mucus, Urine FEW Reference range not established. /LPF    Hyaline Casts, Urine OCCASIONAL (A) NONE /LPF    Calcium Oxalate Crystals, Urine 1+ NONE, 1+ /HPF   Troponin I, High Sensitivity   Result Value Ref Range    Troponin I, High Sensitivity 71 (HH) 0 - 20 ng/L   Basic metabolic panel   Result Value Ref Range    Glucose 118 (H) 74 - 99 mg/dL    Sodium 140 136 - 145 mmol/L    Potassium 3.8 3.5 - 5.3 mmol/L    Chloride 107 98 - 107 mmol/L    Bicarbonate 26 21 - 32 mmol/L    Anion Gap 11 10 - 20 mmol/L    Urea Nitrogen 28 (H) 6 - 23 mg/dL    Creatinine 1.85 (H) 0.50 - 1.30 mg/dL    eGFR 36 (L)  >60 mL/min/1.73m*2    Calcium 9.4 8.6 - 10.3 mg/dL   CBC   Result Value Ref Range    WBC 7.9 4.4 - 11.3 x10*3/uL    nRBC 0.0 0.0 - 0.0 /100 WBCs    RBC 3.20 (L) 4.50 - 5.90 x10*6/uL    Hemoglobin 9.3 (L) 13.5 - 17.5 g/dL    Hematocrit 28.7 (L) 41.0 - 52.0 %    MCV 90 80 - 100 fL    MCH 29.1 26.0 - 34.0 pg    MCHC 32.4 32.0 - 36.0 g/dL    RDW 13.4 11.5 - 14.5 %    Platelets 100 (L) 150 - 450 x10*3/uL   Troponin I, High Sensitivity   Result Value Ref Range    Troponin I, High Sensitivity 138 (HH) 0 - 20 ng/L   Basic metabolic panel   Result Value Ref Range    Glucose 96 74 - 99 mg/dL    Sodium 142 136 - 145 mmol/L    Potassium 3.6 3.5 - 5.3 mmol/L    Chloride 105 98 - 107 mmol/L    Bicarbonate 27 21 - 32 mmol/L    Anion Gap 14 10 - 20 mmol/L    Urea Nitrogen 30 (H) 6 - 23 mg/dL    Creatinine 1.68 (H) 0.50 - 1.30 mg/dL    eGFR 41 (L) >60 mL/min/1.73m*2    Calcium 10.3 8.6 - 10.3 mg/dL   CBC   Result Value Ref Range    WBC 7.4 4.4 - 11.3 x10*3/uL    nRBC 0.0 0.0 - 0.0 /100 WBCs    RBC 3.93 (L) 4.50 - 5.90 x10*6/uL    Hemoglobin 11.2 (L) 13.5 - 17.5 g/dL    Hematocrit 35.0 (L) 41.0 - 52.0 %    MCV 89 80 - 100 fL    MCH 28.5 26.0 - 34.0 pg    MCHC 32.0 32.0 - 36.0 g/dL    RDW 13.2 11.5 - 14.5 %    Platelets 114 (L) 150 - 450 x10*3/uL                Assessment/Plan        Principal Problem:    Influenza  Dementia  Hypertension  CKD 3  Hyperlipidemia  Coronary artery disease with elevated troponins    Patient's respiratory status is stable he is on room air.  Will continue with a course of Tamiflu.  Elevated troponin noted and discussed with cardiology cardiology consulted no further workup at this time.  Noted input from  and trying to look into acute rehab for discharge disposition  respite         Ean Mathew MD

## 2024-03-30 NOTE — CARE PLAN
PT note  in  - [facility]  informed.     Awaiting  response.     MO Hogue, PRABHA        3-30-24   1325  [facility]  accepted  MD informed  MO Hogue, PRABHA            3-30-32   1342  MD  said dc can  be  today  [facility]  said  transport  can be  after  4 pm  Report will be   to  [phone]    Awaiting  dc order, transport  time    SW  called wife  Ban and updated her about  dc. It was  discussed getting  a  wheelchair for pt  in the future in case  legs  were  weak  to see if that  would help with getting  pt  around  apt       Ban  will be  emailed with  transport  time  MO Hogue, PRABHA

## 2024-03-30 NOTE — PROGRESS NOTES
Physical Therapy    Physical Therapy Evaluation    Patient Name: Maycol Christiansen  MRN: [id number]  Today's Date: 3/30/2024   Time Calculation  Start Time: 0818  Stop Time: 0829  Time Calculation (min): 11 min    Assessment/Plan   PT Assessment  PT Assessment Results: Decreased strength, Impaired balance, Decreased endurance, Decreased range of motion, Decreased mobility, Decreased cognition, Impaired judgement, Decreased safety awareness  Rehab Prognosis: Fair  Evaluation/Treatment Tolerance: Patient limited by fatigue  Medical Staff Made Aware: Yes  Strengths: Living arrangement secure, Rehab experience  Barriers to Participation: Ability to acquire knowledge, Access to adaptive/assistive products, Comorbidities  End of Session Communication: Bedside nurse  Assessment Comment: Patient is 78 year old male presenting to physical therapy assessment with impaired strength, balance and functional mobility. Patient was noted to require minimal to moderate assist for mobility completed with increased verbal and tactile cues for safety and efficiency with completion.  At this time, the patient presents with physical therapy impairments warranting inpatient PT services to address impairments and optimize functional mobility.  End of Session Patient Position: Bed, 3 rail up, Alarm on  IP OR SWING BED PT PLAN  Inpatient or Swing Bed: Inpatient  PT Plan  Treatment/Interventions: Bed mobility, Transfer training, Gait training, Stair training, Balance training, Neuromuscular re-education, Strengthening, Endurance training, Therapeutic exercise, Therapeutic activity, Home exercise program  PT Plan: Skilled PT  PT Frequency: 3 times per week  PT Discharge Recommendations: Moderate intensity level of continued care  PT Recommended Transfer Status: Assist x1, Assistive device  PT - OK to Discharge: Yes (Per PT POC)      Subjective   General Visit Information:  General  Reason for Referral: decline in ADLs; 79 yo with PMHx dementia  presenting to ED due to cough + malaise and near fall at home. +flu  Referred By: Damian Starks  Past Medical History Relevant to Rehab: moderately-severe dementia, HTN, CKD stage III, RA, polyarthritis, HTN, HLD, GERD, thyroid dysfunction, CAD, thoracic aortic aneurysm  Family/Caregiver Present: No  Prior to Session Communication: Bedside nurse  Patient Position Received: Bed, 3 rail up, Alarm on  Preferred Learning Style: visual, verbal, kinesthetic  General Comment: Supine in bed upon arrival, agreeable to PT evaluation  Home Living:  Home Living  Type of Home: House  Lives With: Spouse  Home Adaptive Equipment: Walker rolling or standard (Difficult to determine but stated had walker)  Home Layout: One level  Home Living Comments: Unable to determine if patient has steps to enter home  Prior Level of Function:  Prior Function Per Pt/Caregiver Report  Level of [place]: Unable to asses at this time (Patient unable to provide information)  Receives Help From: Family  Precautions:  Precautions  Medical Precautions: Fall precautions  Vital Signs:       Objective   Pain:  Pain Assessment  Pain Assessment: 0-10  Pain Score: 0 - No pain  Cognition:  Cognition  Overall Cognitive Status: Impaired at baseline  Arousal/Alertness: Generalized responses  Orientation Level: Disoriented X4  Following Commands: Follows one step commands with repetition  Problem Solving: Assistance required to identify errors made  Insight: Severe  Impulsive: Moderately    General Assessments:     Activity Tolerance  Endurance: Tolerates 10 - 20 min exercise with multiple rests    Static Sitting Balance  Static Sitting-Balance Support: Bilateral upper extremity supported, Feet supported  Static Sitting-Level of Assistance: Minimum assistance, Contact guard    Static Standing Balance  Static Standing-Balance Support: Bilateral upper extremity supported  Static Standing-Level of Assistance: Minimum assistance  Functional Assessments:  Bed  Mobility  Bed Mobility: Yes  Bed Mobility 1  Bed Mobility 1: Sitting to supine, Supine to sitting  Level of Assistance 1: Minimum assistance, Moderate assistance    Transfers  Transfer: Yes  Transfer 1  Technique 1: Sit to stand  Transfer Device 1: Walker  Transfer Level of Assistance 1: Minimum assistance, Maximum verbal cues, Moderate tactile cues  Transfers 2  Technique 2: Stand to sit  Transfer Device 2: Walker  Transfer Level of Assistance 2: Contact guard, Maximum verbal cues, Moderate tactile cues    Ambulation/Gait Training  Ambulation/Gait Training Performed: Yes  Ambulation/Gait Training 1  Surface 1: Level tile  Device 1: Rolling walker  Gait Support Devices: Gait belt  Assistance 1: Minimum assistance, Maximum verbal cues, Moderate tactile cues, Moderate assistance  Quality of Gait 1: Inconsistent stride length, Decreased step length, Shuffling gait, Antalgic  Comments/Distance (ft) 1: 7ft; increased assist for walker use and safety  Extremity/Trunk Assessments:  RLE   RLE : Exceptions to WFL  Strength RLE  RLE Overall Strength: Greater than or equal to 3/5 as evidenced by functional mobility  LLE   LLE : Exceptions to WFL  Strength LLE  LLE Overall Strength: Greater than or equal to 3/5 as evidenced by functional mobility  Outcome Measures:  [facility] Basic Mobility  Turning from your back to your side while in a flat bed without using bedrails: A little  Moving from lying on your back to sitting on the side of a flat bed without using bedrails: A little  Moving to and from bed to chair (including a wheelchair): A lot  Standing up from a chair using your arms (e.g. wheelchair or bedside chair): A lot  To walk in hospital room: A lot  Climbing 3-5 steps with railing: Total  Basic Mobility - Total Score: 13    Encounter Problems       Encounter Problems (Active)       Mobility       STG - Patient will ambulate >75ft with use of FWW and CGA       Start:  03/30/24    Expected End:  04/13/24               PT  Transfers       STG - Transfer from bed to chair CGA with FWW       Start:  03/30/24    Expected End:  04/13/24            STG - Patient will perform bed mobility mod I        Start:  03/30/24    Expected End:  04/13/24            STG - Patient will transfer sit to and from stand supervision with FWW       Start:  03/30/24    Expected End:  04/13/24                   Education Documentation  Mobility Training, taught by Deana Huerta PT at 3/30/2024 10:20 AM.  Learner: Patient  Readiness: Acceptance  Method: Explanation  Response: Needs Reinforcement    Education Comments  No comments found.

## 2024-03-30 NOTE — CARE PLAN
The patient's goals for the shift include Remain free from falls    The clinical goals for the shift include free of falls    Over the shift, the patient did not make progress toward the following goals. Barriers to progression include confusion. Recommendations to address these barriers include increase monitoring.

## 2024-03-30 NOTE — DISCHARGE SUMMARY
Discharge Diagnosis  Influenza    Issues Requiring Follow-Up  influenza    Discharge Meds     Your medication list        START taking these medications        Instructions Last Dose Given Next Dose Due   ipratropium-albuteroL 0.5-2.5 mg/3 mL nebulizer solution  Commonly known as: Duo-Neb      Take 3 mL by nebulization every 2 hours if needed for wheezing or shortness of breath.       oseltamivir 30 mg capsule  Commonly known as: Tamiflu  Start taking on: March 31, 2024      Take 1 capsule (30 mg) by mouth once every 24 hours for 2 days. Do not start before March 31, 2024.              CHANGE how you take these medications        Instructions Last Dose Given Next Dose Due   clopidogrel 75 mg tablet  Commonly known as: Plavix  What changed:   how much to take  how to take this  when to take this  additional instructions      Take 1 Tab Daily              CONTINUE taking these medications        Instructions Last Dose Given Next Dose Due   ALPRAZolam 1 mg tablet  Commonly known as: Xanax      Take 1 tablet (1 mg) by mouth once daily at bedtime.       calcitriol 0.25 mcg capsule  Commonly known as: Rocaltrol           calcitriol 0.25 mcg capsule  Commonly known as: Rocaltrol           Centrum Silver  Generic drug: multivitamin with minerals iron-free           docusate sodium 100 mg capsule  Commonly known as: Colace           levothyroxine 150 mcg tablet  Commonly known as: Synthroid, Levoxyl      TAKE 1 TABLET (150 MCG) BY MOUTH ONCE DAILY.       melatonin 10 mg tablet           metoprolol succinate XL 25 mg 24 hr tablet  Commonly known as: Toprol-XL      Take 1 tablet (25 mg) by mouth once daily.       mirtazapine 30 mg tablet  Commonly known as: Remeron           rosuvastatin 5 mg tablet  Commonly known as: Crestor      Take 1 tablet (5 mg) by mouth once daily at bedtime.                 Where to Get Your Medications        These medications were sent to [facility]/pharmacy #[id number] - [place], [place] - [address]  [address], [place] [address]      Phone: [phone]   ipratropium-albuteroL 0.5-2.5 mg/3 mL nebulizer solution  levothyroxine 150 mcg tablet  oseltamivir 30 mg capsule  rosuvastatin 5 mg tablet         Test Results Pending At Discharge  Pending Labs       Order Current Status    Blood Culture Preliminary result    Blood Culture Preliminary result            Hospital Course   Pt admitted to hospital for general weakness and was noted to have influenza and treated, did have elevated troponin for which he saw cardiology and no further work up needed as pt is asymptomatic  He is going to be discharged to acute rehab for ongoing therapy    Pertinent Physical Exam At Time of Discharge      Outpatient Follow-Up  No future appointments.  Time > 30 min in discharge planning      Ean Mathew MD

## 2024-03-30 NOTE — CARE PLAN
The patient's goals for the shift include Remain free from falls    The clinical goals for the shift include free from fals

## 2024-03-31 ENCOUNTER — LAB REQUISITION (OUTPATIENT)
Dept: LAB | Facility: HOSPITAL | Age: 79
End: 2024-03-31
Payer: MEDICARE

## 2024-03-31 DIAGNOSIS — Z51.89 ENCOUNTER FOR OTHER SPECIFIED AFTERCARE: ICD-10-CM

## 2024-03-31 LAB
ALBUMIN SERPL BCP-MCNC: 3.3 G/DL (ref 3.4–5)
ALP SERPL-CCNC: 43 U/L (ref 33–136)
ALT SERPL W P-5'-P-CCNC: 21 U/L (ref 10–52)
ANION GAP SERPL CALC-SCNC: 10 MMOL/L (ref 10–20)
AST SERPL W P-5'-P-CCNC: 41 U/L (ref 9–39)
BASOPHILS # BLD MANUAL: 0 X10*3/UL (ref 0–0.1)
BASOPHILS NFR BLD MANUAL: 0 %
BILIRUB SERPL-MCNC: 1.6 MG/DL (ref 0–1.2)
BUN SERPL-MCNC: 36 MG/DL (ref 6–23)
BURR CELLS BLD QL SMEAR: ABNORMAL
CALCIUM SERPL-MCNC: 9.2 MG/DL (ref 8.6–10.3)
CHLORIDE SERPL-SCNC: 106 MMOL/L (ref 98–107)
CO2 SERPL-SCNC: 29 MMOL/L (ref 21–32)
CREAT SERPL-MCNC: 1.86 MG/DL (ref 0.5–1.3)
EGFRCR SERPLBLD CKD-EPI 2021: 36 ML/MIN/1.73M*2
EOSINOPHIL # BLD MANUAL: 0.23 X10*3/UL (ref 0–0.4)
EOSINOPHIL NFR BLD MANUAL: 5 %
ERYTHROCYTE [DISTWIDTH] IN BLOOD BY AUTOMATED COUNT: 13.2 % (ref 11.5–14.5)
GLUCOSE SERPL-MCNC: 96 MG/DL (ref 74–99)
HCT VFR BLD AUTO: 29.2 % (ref 41–52)
HGB BLD-MCNC: 9.4 G/DL (ref 13.5–17.5)
IMM GRANULOCYTES # BLD AUTO: 0.01 X10*3/UL (ref 0–0.5)
IMM GRANULOCYTES NFR BLD AUTO: 0.2 % (ref 0–0.9)
LYMPHOCYTES # BLD MANUAL: 0.78 X10*3/UL (ref 0.8–3)
LYMPHOCYTES NFR BLD MANUAL: 17 %
MCH RBC QN AUTO: 28.6 PG (ref 26–34)
MCHC RBC AUTO-ENTMCNC: 32.2 G/DL (ref 32–36)
MCV RBC AUTO: 89 FL (ref 80–100)
MONOCYTES # BLD MANUAL: 0.14 X10*3/UL (ref 0.05–0.8)
MONOCYTES NFR BLD MANUAL: 3 %
NEUTROPHILS # BLD MANUAL: 3.31 X10*3/UL (ref 1.6–5.5)
NEUTS BAND # BLD MANUAL: 0.23 X10*3/UL (ref 0–0.5)
NEUTS BAND NFR BLD MANUAL: 5 %
NEUTS SEG # BLD MANUAL: 3.08 X10*3/UL (ref 1.6–5)
NEUTS SEG NFR BLD MANUAL: 67 %
NRBC BLD-RTO: 0 /100 WBCS (ref 0–0)
PLATELET # BLD AUTO: 118 X10*3/UL (ref 150–450)
POLYCHROMASIA BLD QL SMEAR: ABNORMAL
POTASSIUM SERPL-SCNC: 3.7 MMOL/L (ref 3.5–5.3)
PROT SERPL-MCNC: 5.3 G/DL (ref 6.4–8.2)
RBC # BLD AUTO: 3.29 X10*6/UL (ref 4.5–5.9)
RBC MORPH BLD: ABNORMAL
SODIUM SERPL-SCNC: 141 MMOL/L (ref 136–145)
TOTAL CELLS COUNTED BLD: 100
VARIANT LYMPHS # BLD MANUAL: 0.14 X10*3/UL (ref 0–0.3)
VARIANT LYMPHS NFR BLD: 3 %
WBC # BLD AUTO: 4.6 X10*3/UL (ref 4.4–11.3)

## 2024-03-31 PROCEDURE — 85007 BL SMEAR W/DIFF WBC COUNT: CPT

## 2024-03-31 PROCEDURE — 80053 COMPREHEN METABOLIC PANEL: CPT

## 2024-03-31 PROCEDURE — 85027 COMPLETE CBC AUTOMATED: CPT

## 2024-04-01 ENCOUNTER — LAB REQUISITION (OUTPATIENT)
Dept: LAB | Facility: LAB | Age: 79
End: 2024-04-01
Payer: MEDICARE

## 2024-04-01 DIAGNOSIS — Z51.89 ENCOUNTER FOR OTHER SPECIFIED AFTERCARE: ICD-10-CM

## 2024-04-01 LAB
ALBUMIN SERPL BCP-MCNC: 3.2 G/DL (ref 3.4–5)
ALP SERPL-CCNC: 47 U/L (ref 33–136)
ALT SERPL W P-5'-P-CCNC: 25 U/L (ref 10–52)
ANION GAP SERPL CALC-SCNC: 11 MMOL/L (ref 10–20)
AST SERPL W P-5'-P-CCNC: 36 U/L (ref 9–39)
BACTERIA BLD CULT: NORMAL
BACTERIA BLD CULT: NORMAL
BASOPHILS # BLD AUTO: 0.02 X10*3/UL (ref 0–0.1)
BASOPHILS NFR BLD AUTO: 0.3 %
BILIRUB SERPL-MCNC: 0.9 MG/DL (ref 0–1.2)
BUN SERPL-MCNC: 41 MG/DL (ref 6–23)
CALCIUM SERPL-MCNC: 9.2 MG/DL (ref 8.6–10.3)
CHLORIDE SERPL-SCNC: 106 MMOL/L (ref 98–107)
CO2 SERPL-SCNC: 30 MMOL/L (ref 21–32)
CREAT SERPL-MCNC: 2.05 MG/DL (ref 0.5–1.3)
EGFRCR SERPLBLD CKD-EPI 2021: 32 ML/MIN/1.73M*2
EOSINOPHIL # BLD AUTO: 1.26 X10*3/UL (ref 0–0.4)
EOSINOPHIL NFR BLD AUTO: 18.7 %
ERYTHROCYTE [DISTWIDTH] IN BLOOD BY AUTOMATED COUNT: 13.2 % (ref 11.5–14.5)
GLUCOSE SERPL-MCNC: 73 MG/DL (ref 74–99)
HCT VFR BLD AUTO: 30.2 % (ref 41–52)
HGB BLD-MCNC: 9.4 G/DL (ref 13.5–17.5)
IMM GRANULOCYTES # BLD AUTO: 0.02 X10*3/UL (ref 0–0.5)
IMM GRANULOCYTES NFR BLD AUTO: 0.3 % (ref 0–0.9)
LYMPHOCYTES # BLD AUTO: 1.1 X10*3/UL (ref 0.8–3)
LYMPHOCYTES NFR BLD AUTO: 16.3 %
MCH RBC QN AUTO: 28.4 PG (ref 26–34)
MCHC RBC AUTO-ENTMCNC: 31.1 G/DL (ref 32–36)
MCV RBC AUTO: 91 FL (ref 80–100)
MONOCYTES # BLD AUTO: 0.47 X10*3/UL (ref 0.05–0.8)
MONOCYTES NFR BLD AUTO: 7 %
NEUTROPHILS # BLD AUTO: 3.88 X10*3/UL (ref 1.6–5.5)
NEUTROPHILS NFR BLD AUTO: 57.4 %
NRBC BLD-RTO: 0 /100 WBCS (ref 0–0)
PLATELET # BLD AUTO: 124 X10*3/UL (ref 150–450)
POTASSIUM SERPL-SCNC: 3.8 MMOL/L (ref 3.5–5.3)
PROT SERPL-MCNC: 5.2 G/DL (ref 6.4–8.2)
RBC # BLD AUTO: 3.31 X10*6/UL (ref 4.5–5.9)
SODIUM SERPL-SCNC: 143 MMOL/L (ref 136–145)
WBC # BLD AUTO: 6.8 X10*3/UL (ref 4.4–11.3)

## 2024-04-01 PROCEDURE — 85025 COMPLETE CBC W/AUTO DIFF WBC: CPT

## 2024-04-01 PROCEDURE — 80053 COMPREHEN METABOLIC PANEL: CPT

## 2024-04-03 ENCOUNTER — LAB REQUISITION (OUTPATIENT)
Dept: LAB | Facility: LAB | Age: 79
End: 2024-04-03
Payer: MEDICARE

## 2024-04-03 DIAGNOSIS — Z51.89 ENCOUNTER FOR OTHER SPECIFIED AFTERCARE: ICD-10-CM

## 2024-04-03 LAB
ALBUMIN SERPL BCP-MCNC: 3.5 G/DL (ref 3.4–5)
ALP SERPL-CCNC: 45 U/L (ref 33–136)
ALT SERPL W P-5'-P-CCNC: 29 U/L (ref 10–52)
ANION GAP SERPL CALC-SCNC: 10 MMOL/L (ref 10–20)
AST SERPL W P-5'-P-CCNC: 29 U/L (ref 9–39)
BILIRUB SERPL-MCNC: 0.8 MG/DL (ref 0–1.2)
BUN SERPL-MCNC: 32 MG/DL (ref 6–23)
CALCIUM SERPL-MCNC: 9.2 MG/DL (ref 8.6–10.3)
CHLORIDE SERPL-SCNC: 106 MMOL/L (ref 98–107)
CO2 SERPL-SCNC: 32 MMOL/L (ref 21–32)
CREAT SERPL-MCNC: 1.91 MG/DL (ref 0.5–1.3)
EGFRCR SERPLBLD CKD-EPI 2021: 35 ML/MIN/1.73M*2
ERYTHROCYTE [DISTWIDTH] IN BLOOD BY AUTOMATED COUNT: 13 % (ref 11.5–14.5)
GLUCOSE SERPL-MCNC: 73 MG/DL (ref 74–99)
HCT VFR BLD AUTO: 30.8 % (ref 41–52)
HGB BLD-MCNC: 9.7 G/DL (ref 13.5–17.5)
MCH RBC QN AUTO: 28.1 PG (ref 26–34)
MCHC RBC AUTO-ENTMCNC: 31.5 G/DL (ref 32–36)
MCV RBC AUTO: 89 FL (ref 80–100)
NRBC BLD-RTO: 0 /100 WBCS (ref 0–0)
PLATELET # BLD AUTO: 165 X10*3/UL (ref 150–450)
POTASSIUM SERPL-SCNC: 4 MMOL/L (ref 3.5–5.3)
PROT SERPL-MCNC: 5.4 G/DL (ref 6.4–8.2)
RBC # BLD AUTO: 3.45 X10*6/UL (ref 4.5–5.9)
SODIUM SERPL-SCNC: 144 MMOL/L (ref 136–145)
WBC # BLD AUTO: 6 X10*3/UL (ref 4.4–11.3)

## 2024-04-03 PROCEDURE — 85027 COMPLETE CBC AUTOMATED: CPT

## 2024-04-03 PROCEDURE — 80053 COMPREHEN METABOLIC PANEL: CPT

## 2024-04-09 ENCOUNTER — APPOINTMENT (OUTPATIENT)
Dept: PRIMARY CARE | Facility: CLINIC | Age: 79
End: 2024-04-09
Payer: MEDICARE

## 2024-04-09 RX ORDER — CLINDAMYCIN HYDROCHLORIDE 300 MG/1
CAPSULE ORAL
COMMUNITY
Start: 2019-10-08

## 2024-04-09 RX ORDER — MEMANTINE HYDROCHLORIDE 7 MG/1
CAPSULE, EXTENDED RELEASE ORAL
COMMUNITY
Start: 2020-10-08

## 2024-04-09 RX ORDER — MIRABEGRON 50 MG/1
TABLET, EXTENDED RELEASE ORAL
COMMUNITY
Start: 2018-08-07

## 2024-04-09 RX ORDER — TRIMETHOPRIM 100 MG/1
TABLET ORAL EVERY 12 HOURS
COMMUNITY
Start: 2020-06-05

## 2024-04-09 RX ORDER — NITROFURANTOIN 25; 75 MG/1; MG/1
CAPSULE ORAL
COMMUNITY
Start: 2019-11-04

## 2024-04-09 RX ORDER — LIDOCAINE AND PRILOCAINE 25; 25 MG/G; MG/G
5 CREAM TOPICAL
COMMUNITY
Start: 2017-11-15

## 2024-04-09 RX ORDER — FERROUS SULFATE 325(65) MG
TABLET ORAL
COMMUNITY
Start: 2020-06-19

## 2024-04-09 RX ORDER — POLYETHYLENE GLYCOL 3350, SODIUM SULFATE, SODIUM CHLORIDE, POTASSIUM CHLORIDE, SODIUM ASCORBATE, AND ASCORBIC ACID 7.5-2.691G
KIT ORAL
COMMUNITY
Start: 2021-02-02

## 2024-04-10 ENCOUNTER — OFFICE VISIT (OUTPATIENT)
Dept: PRIMARY CARE | Facility: CLINIC | Age: 79
End: 2024-04-10
Payer: MEDICARE

## 2024-04-10 VITALS
HEIGHT: 70 IN | BODY MASS INDEX: 20.39 KG/M2 | HEART RATE: 62 BPM | WEIGHT: 142.4 LBS | SYSTOLIC BLOOD PRESSURE: 132 MMHG | DIASTOLIC BLOOD PRESSURE: 68 MMHG | OXYGEN SATURATION: 95 % | RESPIRATION RATE: 16 BRPM

## 2024-04-10 DIAGNOSIS — D64.9 ANEMIA, UNSPECIFIED TYPE: Primary | ICD-10-CM

## 2024-04-10 PROCEDURE — 1159F MED LIST DOCD IN RCRD: CPT | Performed by: INTERNAL MEDICINE

## 2024-04-10 PROCEDURE — 99213 OFFICE O/P EST LOW 20 MIN: CPT | Performed by: INTERNAL MEDICINE

## 2024-04-10 PROCEDURE — 3075F SYST BP GE 130 - 139MM HG: CPT | Performed by: INTERNAL MEDICINE

## 2024-04-10 PROCEDURE — 1126F AMNT PAIN NOTED NONE PRSNT: CPT | Performed by: INTERNAL MEDICINE

## 2024-04-10 PROCEDURE — 1036F TOBACCO NON-USER: CPT | Performed by: INTERNAL MEDICINE

## 2024-04-10 PROCEDURE — 3078F DIAST BP <80 MM HG: CPT | Performed by: INTERNAL MEDICINE

## 2024-04-10 PROCEDURE — 1157F ADVNC CARE PLAN IN RCRD: CPT | Performed by: INTERNAL MEDICINE

## 2024-04-10 RX ORDER — QUETIAPINE FUMARATE 25 MG/1
TABLET, FILM COATED ORAL
COMMUNITY
Start: 2024-04-05

## 2024-04-10 RX ORDER — HYDROCORTISONE 10 MG/G
CREAM TOPICAL
COMMUNITY
Start: 2024-04-08

## 2024-04-10 ASSESSMENT — PAIN SCALES - GENERAL: PAINLEVEL: 0-NO PAIN

## 2024-04-10 NOTE — PROGRESS NOTES
Patient is here for a hospital f/up and wanted to discuss back pain , and itching has used OTC cream but doesn't help   No Pain today   No RF's

## 2024-04-10 NOTE — PROGRESS NOTES
"Subjective   Patient ID: Maycol Christiansen is a 78 y.o. male who presents for Hospital Follow-up.  In for follow up for chronic Anemia.         Review of Systems   All other systems reviewed and are negative.      Objective   Physical Exam  /68   Pulse 62   Resp 16   Ht 1.765 m (5' 9.5\")   Wt 64.6 kg (142 lb 6.4 oz) Comment: with clothes  SpO2 95%   BMI 20.73 kg/m²         Assessment/Plan   Problem List Items Addressed This Visit       Anemia - Primary     Anemia, Will follow. Recheck CBC     "

## 2024-04-16 ENCOUNTER — TELEPHONE (OUTPATIENT)
Dept: PRIMARY CARE | Facility: CLINIC | Age: 79
End: 2024-04-16
Payer: MEDICARE

## 2024-04-16 NOTE — TELEPHONE ENCOUNTER
Patient home physical therapists called to inform JTP that he has been doing well with mobility and will be continuing with evaluations only .

## 2024-05-17 ENCOUNTER — TELEPHONE (OUTPATIENT)
Dept: PRIMARY CARE | Facility: CLINIC | Age: 79
End: 2024-05-17
Payer: MEDICARE

## 2024-05-17 NOTE — TELEPHONE ENCOUNTER
Called spoke w/ wife informed if symptoms get worse head to the emergency dept, also asked if he wanted to set up a visit with Dr. Carrillo sh stated no will call on Monday morning if apt is needed, stomach no longer up-set feeling better per Maycol, AM

## 2024-05-17 NOTE — TELEPHONE ENCOUNTER
Pt reported flu-like Sx, but declined nurse visit.  Said he would see the nurse at next scheduled visit.

## 2024-06-03 DIAGNOSIS — I25.2 MI, OLD: ICD-10-CM

## 2024-06-03 DIAGNOSIS — E78.2 MIXED HYPERLIPIDEMIA: ICD-10-CM

## 2024-06-03 RX ORDER — CALCITRIOL 0.25 UG/1
CAPSULE ORAL
Qty: 120 CAPSULE | Refills: 1 | Status: SHIPPED | OUTPATIENT
Start: 2024-06-03

## 2024-06-03 RX ORDER — CLOPIDOGREL BISULFATE 75 MG/1
TABLET ORAL
Qty: 90 TABLET | Refills: 2 | Status: SHIPPED | OUTPATIENT
Start: 2024-06-03

## 2024-06-07 RX ORDER — CALCITRIOL 0.25 UG/1
CAPSULE ORAL
Qty: 38 CAPSULE | OUTPATIENT
Start: 2024-06-07

## 2024-06-25 DIAGNOSIS — E78.2 MIXED HYPERLIPIDEMIA: ICD-10-CM

## 2024-06-25 RX ORDER — CALCITRIOL 0.25 UG/1
CAPSULE ORAL
Qty: 360 CAPSULE | Refills: 1 | Status: SHIPPED | OUTPATIENT
Start: 2024-06-25

## 2024-07-08 DIAGNOSIS — I1A.0 RESISTANT HYPERTENSION: ICD-10-CM

## 2024-07-08 RX ORDER — METOPROLOL SUCCINATE 25 MG/1
25 TABLET, EXTENDED RELEASE ORAL DAILY
Qty: 30 TABLET | OUTPATIENT
Start: 2024-07-08

## 2024-07-11 DIAGNOSIS — Z00.00 ENCOUNTER FOR ANNUAL WELLNESS EXAM IN MEDICARE PATIENT: ICD-10-CM

## 2024-07-11 DIAGNOSIS — E55.9 VITAMIN D DEFICIENCY: ICD-10-CM

## 2024-07-11 DIAGNOSIS — R79.89 ABNORMAL CBC: ICD-10-CM

## 2024-08-22 ENCOUNTER — LAB (OUTPATIENT)
Dept: LAB | Facility: LAB | Age: 79
End: 2024-08-22
Payer: MEDICARE

## 2024-08-22 DIAGNOSIS — Z00.00 ENCOUNTER FOR ANNUAL WELLNESS EXAM IN MEDICARE PATIENT: ICD-10-CM

## 2024-08-22 DIAGNOSIS — R79.89 ABNORMAL CBC: ICD-10-CM

## 2024-08-22 DIAGNOSIS — E55.9 VITAMIN D DEFICIENCY: ICD-10-CM

## 2024-08-22 LAB
25(OH)D3 SERPL-MCNC: 42 NG/ML (ref 30–100)
ALT SERPL W P-5'-P-CCNC: 12 U/L (ref 10–52)
ANION GAP SERPL CALC-SCNC: 11 MMOL/L (ref 10–20)
APPEARANCE UR: CLEAR
BILIRUB UR STRIP.AUTO-MCNC: NEGATIVE MG/DL
BUN SERPL-MCNC: 29 MG/DL (ref 6–23)
CALCIUM SERPL-MCNC: 10.6 MG/DL (ref 8.6–10.6)
CHLORIDE SERPL-SCNC: 109 MMOL/L (ref 98–107)
CHOLEST SERPL-MCNC: 121 MG/DL (ref 0–199)
CHOLESTEROL/HDL RATIO: 3.2
CO2 SERPL-SCNC: 30 MMOL/L (ref 21–32)
COLOR UR: YELLOW
CREAT SERPL-MCNC: 2.04 MG/DL (ref 0.5–1.3)
EGFRCR SERPLBLD CKD-EPI 2021: 32 ML/MIN/1.73M*2
ERYTHROCYTE [DISTWIDTH] IN BLOOD BY AUTOMATED COUNT: 13.4 % (ref 11.5–14.5)
EST. AVERAGE GLUCOSE BLD GHB EST-MCNC: 103 MG/DL
GLUCOSE SERPL-MCNC: 92 MG/DL (ref 74–99)
GLUCOSE UR STRIP.AUTO-MCNC: NORMAL MG/DL
HBA1C MFR BLD: 5.2 %
HCT VFR BLD AUTO: 37.1 % (ref 41–52)
HDLC SERPL-MCNC: 38.1 MG/DL
HGB BLD-MCNC: 11.7 G/DL (ref 13.5–17.5)
KETONES UR STRIP.AUTO-MCNC: NEGATIVE MG/DL
LDLC SERPL CALC-MCNC: 52 MG/DL
LEUKOCYTE ESTERASE UR QL STRIP.AUTO: NEGATIVE
MCH RBC QN AUTO: 28.5 PG (ref 26–34)
MCHC RBC AUTO-ENTMCNC: 31.5 G/DL (ref 32–36)
MCV RBC AUTO: 90 FL (ref 80–100)
NITRITE UR QL STRIP.AUTO: NEGATIVE
NON HDL CHOLESTEROL: 83 MG/DL (ref 0–149)
NRBC BLD-RTO: 0 /100 WBCS (ref 0–0)
PH UR STRIP.AUTO: 6 [PH]
PLATELET # BLD AUTO: 160 X10*3/UL (ref 150–450)
POTASSIUM SERPL-SCNC: 3.7 MMOL/L (ref 3.5–5.3)
PROT UR STRIP.AUTO-MCNC: NORMAL MG/DL
PSA SERPL-MCNC: 0.23 NG/ML
RBC # BLD AUTO: 4.11 X10*6/UL (ref 4.5–5.9)
RBC # UR STRIP.AUTO: NEGATIVE /UL
RBC #/AREA URNS AUTO: NORMAL /HPF
SODIUM SERPL-SCNC: 146 MMOL/L (ref 136–145)
SP GR UR STRIP.AUTO: 1.02
TRIGL SERPL-MCNC: 153 MG/DL (ref 0–149)
TSH SERPL-ACNC: 0.06 MIU/L (ref 0.44–3.98)
UROBILINOGEN UR STRIP.AUTO-MCNC: NORMAL MG/DL
VIT B12 SERPL-MCNC: 616 PG/ML (ref 211–911)
VLDL: 31 MG/DL (ref 0–40)
WBC # BLD AUTO: 5.9 X10*3/UL (ref 4.4–11.3)
WBC #/AREA URNS AUTO: NORMAL /HPF

## 2024-08-22 PROCEDURE — 36415 COLL VENOUS BLD VENIPUNCTURE: CPT

## 2024-08-29 RX ORDER — CARVEDILOL 6.25 MG/1
TABLET ORAL EVERY 12 HOURS
COMMUNITY
Start: 2023-04-28

## 2024-08-29 NOTE — PROGRESS NOTES
Patient is here for annual medicare wellness exam , has some concerns to discuss with JTP per spouse and sundowning medication .   Pain level 4/10 but states its normal within right foot  NKA   No Rx RF's needed

## 2024-08-30 ENCOUNTER — APPOINTMENT (OUTPATIENT)
Dept: PRIMARY CARE | Facility: CLINIC | Age: 79
End: 2024-08-30
Payer: MEDICARE

## 2024-08-30 VITALS
RESPIRATION RATE: 16 BRPM | OXYGEN SATURATION: 99 % | HEART RATE: 62 BPM | TEMPERATURE: 98.2 F | BODY MASS INDEX: 21.82 KG/M2 | SYSTOLIC BLOOD PRESSURE: 134 MMHG | HEIGHT: 70 IN | DIASTOLIC BLOOD PRESSURE: 70 MMHG | WEIGHT: 152.4 LBS

## 2024-08-30 DIAGNOSIS — R45.86 MOOD CHANGES: Primary | ICD-10-CM

## 2024-08-30 PROCEDURE — 1125F AMNT PAIN NOTED PAIN PRSNT: CPT | Performed by: INTERNAL MEDICINE

## 2024-08-30 PROCEDURE — 1123F ACP DISCUSS/DSCN MKR DOCD: CPT | Performed by: INTERNAL MEDICINE

## 2024-08-30 PROCEDURE — 1159F MED LIST DOCD IN RCRD: CPT | Performed by: INTERNAL MEDICINE

## 2024-08-30 PROCEDURE — 3075F SYST BP GE 130 - 139MM HG: CPT | Performed by: INTERNAL MEDICINE

## 2024-08-30 PROCEDURE — 3078F DIAST BP <80 MM HG: CPT | Performed by: INTERNAL MEDICINE

## 2024-08-30 PROCEDURE — 1036F TOBACCO NON-USER: CPT | Performed by: INTERNAL MEDICINE

## 2024-08-30 PROCEDURE — 1170F FXNL STATUS ASSESSED: CPT | Performed by: INTERNAL MEDICINE

## 2024-08-30 PROCEDURE — G0439 PPPS, SUBSEQ VISIT: HCPCS | Performed by: INTERNAL MEDICINE

## 2024-08-30 PROCEDURE — 1157F ADVNC CARE PLAN IN RCRD: CPT | Performed by: INTERNAL MEDICINE

## 2024-08-30 RX ORDER — QUETIAPINE FUMARATE 25 MG/1
25 TABLET, FILM COATED ORAL 2 TIMES DAILY
Qty: 60 TABLET | Refills: 11 | Status: SHIPPED | OUTPATIENT
Start: 2024-08-30 | End: 2025-08-30

## 2024-08-30 ASSESSMENT — ACTIVITIES OF DAILY LIVING (ADL)
DOING_HOUSEWORK: NEEDS ASSISTANCE
TAKING_MEDICATION: TOTAL CARE
DRESSING: INDEPENDENT
BATHING: INDEPENDENT
MANAGING_FINANCES: TOTAL CARE
GROCERY_SHOPPING: TOTAL CARE

## 2024-08-30 ASSESSMENT — PATIENT HEALTH QUESTIONNAIRE - PHQ9
7. TROUBLE CONCENTRATING ON THINGS, SUCH AS READING THE NEWSPAPER OR WATCHING TELEVISION: NEARLY EVERY DAY
SUM OF ALL RESPONSES TO PHQ9 QUESTIONS 1 AND 2: 2
2. FEELING DOWN, DEPRESSED OR HOPELESS: SEVERAL DAYS
1. LITTLE INTEREST OR PLEASURE IN DOING THINGS: SEVERAL DAYS
8. MOVING OR SPEAKING SO SLOWLY THAT OTHER PEOPLE COULD HAVE NOTICED. OR THE OPPOSITE, BEING SO FIGETY OR RESTLESS THAT YOU HAVE BEEN MOVING AROUND A LOT MORE THAN USUAL: NOT AT ALL
4. FEELING TIRED OR HAVING LITTLE ENERGY: MORE THAN HALF THE DAYS
3. TROUBLE FALLING OR STAYING ASLEEP OR SLEEPING TOO MUCH: SEVERAL DAYS
5. POOR APPETITE OR OVEREATING: NOT AT ALL
10. IF YOU CHECKED OFF ANY PROBLEMS, HOW DIFFICULT HAVE THESE PROBLEMS MADE IT FOR YOU TO DO YOUR WORK, TAKE CARE OF THINGS AT HOME, OR GET ALONG WITH OTHER PEOPLE: SOMEWHAT DIFFICULT
6. FEELING BAD ABOUT YOURSELF - OR THAT YOU ARE A FAILURE OR HAVE LET YOURSELF OR YOUR FAMILY DOWN: NEARLY EVERY DAY
SUM OF ALL RESPONSES TO PHQ QUESTIONS 1-9: 11
9. THOUGHTS THAT YOU WOULD BE BETTER OFF DEAD, OR OF HURTING YOURSELF: NOT AT ALL

## 2024-08-30 ASSESSMENT — ANXIETY QUESTIONNAIRES
4. TROUBLE RELAXING: NEARLY EVERY DAY
7. FEELING AFRAID AS IF SOMETHING AWFUL MIGHT HAPPEN: SEVERAL DAYS
6. BECOMING EASILY ANNOYED OR IRRITABLE: MORE THAN HALF THE DAYS
2. NOT BEING ABLE TO STOP OR CONTROL WORRYING: NEARLY EVERY DAY
GAD7 TOTAL SCORE: 15
3. WORRYING TOO MUCH ABOUT DIFFERENT THINGS: NEARLY EVERY DAY
IF YOU CHECKED OFF ANY PROBLEMS ON THIS QUESTIONNAIRE, HOW DIFFICULT HAVE THESE PROBLEMS MADE IT FOR YOU TO DO YOUR WORK, TAKE CARE OF THINGS AT HOME, OR GET ALONG WITH OTHER PEOPLE: SOMEWHAT DIFFICULT
5. BEING SO RESTLESS THAT IT IS HARD TO SIT STILL: MORE THAN HALF THE DAYS
1. FEELING NERVOUS, ANXIOUS, OR ON EDGE: SEVERAL DAYS

## 2024-08-30 ASSESSMENT — PAIN SCALES - GENERAL: PAINLEVEL: 4

## 2024-11-14 ENCOUNTER — TELEPHONE (OUTPATIENT)
Dept: PRIMARY CARE | Facility: CLINIC | Age: 79
End: 2024-11-14
Payer: MEDICARE

## 2024-11-14 DIAGNOSIS — E78.2 MIXED HYPERLIPIDEMIA: ICD-10-CM

## 2024-11-14 RX ORDER — ROSUVASTATIN CALCIUM 5 MG/1
5 TABLET, COATED ORAL NIGHTLY
Qty: 90 TABLET | Refills: 3 | Status: SHIPPED | OUTPATIENT
Start: 2024-11-14 | End: 2024-11-15 | Stop reason: SDUPTHER

## 2024-11-15 DIAGNOSIS — E78.2 MIXED HYPERLIPIDEMIA: ICD-10-CM

## 2024-11-17 RX ORDER — ROSUVASTATIN CALCIUM 5 MG/1
5 TABLET, COATED ORAL NIGHTLY
Qty: 90 TABLET | Refills: 0 | Status: SHIPPED | OUTPATIENT
Start: 2024-11-17

## 2025-01-01 DIAGNOSIS — I1A.0 RESISTANT HYPERTENSION: ICD-10-CM

## 2025-01-02 RX ORDER — METOPROLOL SUCCINATE 25 MG/1
25 TABLET, EXTENDED RELEASE ORAL DAILY
Qty: 90 TABLET | Refills: 0 | Status: SHIPPED | OUTPATIENT
Start: 2025-01-02

## 2025-02-24 DIAGNOSIS — E03.9 ACQUIRED HYPOTHYROIDISM: ICD-10-CM

## 2025-02-24 DIAGNOSIS — I25.2 MI, OLD: ICD-10-CM

## 2025-02-24 RX ORDER — LEVOTHYROXINE SODIUM 150 UG/1
150 TABLET ORAL DAILY
Qty: 90 TABLET | Refills: 2 | Status: SHIPPED | OUTPATIENT
Start: 2025-02-24

## 2025-02-24 RX ORDER — CLOPIDOGREL BISULFATE 75 MG/1
TABLET ORAL
Qty: 90 TABLET | Refills: 2 | Status: SHIPPED | OUTPATIENT
Start: 2025-02-24

## 2025-04-02 DIAGNOSIS — I1A.0 RESISTANT HYPERTENSION: ICD-10-CM

## 2025-04-03 RX ORDER — METOPROLOL SUCCINATE 25 MG/1
25 TABLET, EXTENDED RELEASE ORAL DAILY
Qty: 90 TABLET | Refills: 0 | Status: SHIPPED | OUTPATIENT
Start: 2025-04-03

## 2025-04-23 ENCOUNTER — TELEPHONE (OUTPATIENT)
Dept: PRIMARY CARE | Facility: CLINIC | Age: 80
End: 2025-04-23
Payer: MEDICARE

## 2025-04-23 NOTE — TELEPHONE ENCOUNTER
Onset 1wk:  coughing (barely productive), chest congestion, Covid neg.  No other Ex.   Taking OTC Robitussin DM and still not breaking up the congestion.   Advise?    Confirmed [facility] [address].

## 2025-04-28 ENCOUNTER — APPOINTMENT (OUTPATIENT)
Dept: PRIMARY CARE | Facility: CLINIC | Age: 80
End: 2025-04-28
Payer: MEDICARE

## 2025-04-28 VITALS — DIASTOLIC BLOOD PRESSURE: 86 MMHG | OXYGEN SATURATION: 96 % | TEMPERATURE: 98 F | SYSTOLIC BLOOD PRESSURE: 140 MMHG

## 2025-04-28 DIAGNOSIS — E78.9 LIPID DISORDER: ICD-10-CM

## 2025-04-28 DIAGNOSIS — N18.4 STAGE 4 CHRONIC KIDNEY DISEASE (MULTI): Primary | ICD-10-CM

## 2025-04-28 PROCEDURE — 3077F SYST BP >= 140 MM HG: CPT | Performed by: INTERNAL MEDICINE

## 2025-04-28 PROCEDURE — 1157F ADVNC CARE PLAN IN RCRD: CPT | Performed by: INTERNAL MEDICINE

## 2025-04-28 PROCEDURE — 1123F ACP DISCUSS/DSCN MKR DOCD: CPT | Performed by: INTERNAL MEDICINE

## 2025-04-28 PROCEDURE — 1159F MED LIST DOCD IN RCRD: CPT | Performed by: INTERNAL MEDICINE

## 2025-04-28 PROCEDURE — 3079F DIAST BP 80-89 MM HG: CPT | Performed by: INTERNAL MEDICINE

## 2025-04-28 PROCEDURE — 99214 OFFICE O/P EST MOD 30 MIN: CPT | Performed by: INTERNAL MEDICINE

## 2025-04-28 ASSESSMENT — ENCOUNTER SYMPTOMS
DEPRESSION: 1
LOSS OF SENSATION IN FEET: 1
OCCASIONAL FEELINGS OF UNSTEADINESS: 1

## 2025-04-28 NOTE — PROGRESS NOTES
Liver Screening: Computed FIB-4 Calculation unavailable. One or more values for this score either were not found within the given timeframe or did not fit some other criterion.    Interpretation: <1.45 Cirrhosis less likely, 1.45 - 3.25 Indeterminate, >3.25 Cirrhosis more likely    Review of Systems  Visit Vitals  /86 (BP Location: Left arm, Patient Position: Sitting, BP Cuff Size: Adult long)   Temp 36.7 °C (98 °F) (Axillary)   There is no height or weight on file to calculate BMI.   Objective   Physical Exam        Assessment & Plan  Stage 4 chronic kidney disease (Multi)    Orders:    Comprehensive metabolic panel; Future    CBC and Auto Differential; Future    Referral to Nephrology; Future    Lipid disorder    Orders:    Lipid panel; Future              Darren Carrillo MD 04/28/25 10:52 AM

## 2025-04-28 NOTE — ASSESSMENT & PLAN NOTE
Orders:    Comprehensive metabolic panel; Future    CBC and Auto Differential; Future    Referral to Nephrology; Future

## 2025-04-28 NOTE — PROGRESS NOTES
Patient is here for fall yesterday trip over shoes. Last week patient had cough, nasal congestion.

## 2025-04-29 LAB
ALBUMIN SERPL-MCNC: 4.4 G/DL (ref 3.6–5.1)
ALP SERPL-CCNC: 49 U/L (ref 35–144)
ALT SERPL-CCNC: 17 U/L (ref 9–46)
ANION GAP SERPL CALCULATED.4IONS-SCNC: 7 MMOL/L (CALC) (ref 7–17)
AST SERPL-CCNC: 21 U/L (ref 10–35)
BASOPHILS # BLD AUTO: 29 CELLS/UL (ref 0–200)
BASOPHILS NFR BLD AUTO: 0.4 %
BILIRUB SERPL-MCNC: 1 MG/DL (ref 0.2–1.2)
BUN SERPL-MCNC: 30 MG/DL (ref 7–25)
CALCIUM SERPL-MCNC: 10.1 MG/DL (ref 8.6–10.3)
CHLORIDE SERPL-SCNC: 108 MMOL/L (ref 98–110)
CHOLEST SERPL-MCNC: 121 MG/DL
CHOLEST/HDLC SERPL: 2.8 (CALC)
CO2 SERPL-SCNC: 29 MMOL/L (ref 20–32)
CREAT SERPL-MCNC: 2.08 MG/DL (ref 0.7–1.22)
EGFRCR SERPLBLD CKD-EPI 2021: 31 ML/MIN/1.73M2
EOSINOPHIL # BLD AUTO: 190 CELLS/UL (ref 15–500)
EOSINOPHIL NFR BLD AUTO: 2.6 %
ERYTHROCYTE [DISTWIDTH] IN BLOOD BY AUTOMATED COUNT: 13 % (ref 11–15)
GLUCOSE SERPL-MCNC: 90 MG/DL (ref 65–139)
HCT VFR BLD AUTO: 36.9 % (ref 38.5–50)
HDLC SERPL-MCNC: 43 MG/DL
HGB BLD-MCNC: 11.5 G/DL (ref 13.2–17.1)
LDLC SERPL CALC-MCNC: 57 MG/DL (CALC)
LYMPHOCYTES # BLD AUTO: 1205 CELLS/UL (ref 850–3900)
LYMPHOCYTES NFR BLD AUTO: 16.5 %
MCH RBC QN AUTO: 29.6 PG (ref 27–33)
MCHC RBC AUTO-ENTMCNC: 31.2 G/DL (ref 32–36)
MCV RBC AUTO: 94.9 FL (ref 80–100)
MONOCYTES # BLD AUTO: 496 CELLS/UL (ref 200–950)
MONOCYTES NFR BLD AUTO: 6.8 %
NEUTROPHILS # BLD AUTO: 5380 CELLS/UL (ref 1500–7800)
NEUTROPHILS NFR BLD AUTO: 73.7 %
NONHDLC SERPL-MCNC: 78 MG/DL (CALC)
PLATELET # BLD AUTO: 155 THOUSAND/UL (ref 140–400)
PMV BLD REES-ECKER: 10.6 FL (ref 7.5–12.5)
POTASSIUM SERPL-SCNC: 3.8 MMOL/L (ref 3.5–5.3)
PROT SERPL-MCNC: 6.7 G/DL (ref 6.1–8.1)
RBC # BLD AUTO: 3.89 MILLION/UL (ref 4.2–5.8)
SODIUM SERPL-SCNC: 144 MMOL/L (ref 135–146)
TRIGL SERPL-MCNC: 125 MG/DL
WBC # BLD AUTO: 7.3 THOUSAND/UL (ref 3.8–10.8)

## 2025-05-05 ENCOUNTER — TELEPHONE (OUTPATIENT)
Dept: PRIMARY CARE | Facility: CLINIC | Age: 80
End: 2025-05-05
Payer: MEDICARE

## 2025-05-05 NOTE — TELEPHONE ENCOUNTER
Pt having episodes of loose bowel movement for 3 days, no cramping, no blood. Has been taking 1/2 of immodium tablet as directed. Medical advice on how to move forward?    advise

## 2025-05-16 DIAGNOSIS — E78.2 MIXED HYPERLIPIDEMIA: ICD-10-CM

## 2025-05-16 RX ORDER — ROSUVASTATIN CALCIUM 5 MG/1
5 TABLET, COATED ORAL NIGHTLY
Qty: 90 TABLET | Refills: 0 | Status: SHIPPED | OUTPATIENT
Start: 2025-05-16

## 2025-07-02 DIAGNOSIS — I1A.0 RESISTANT HYPERTENSION: ICD-10-CM

## 2025-07-02 RX ORDER — METOPROLOL SUCCINATE 25 MG/1
25 TABLET, EXTENDED RELEASE ORAL DAILY
Qty: 90 TABLET | Refills: 0 | Status: SHIPPED | OUTPATIENT
Start: 2025-07-02

## 2025-07-10 DIAGNOSIS — E78.2 MIXED HYPERLIPIDEMIA: ICD-10-CM

## 2025-07-11 RX ORDER — CALCITRIOL 0.25 UG/1
CAPSULE ORAL
Qty: 108 CAPSULE | Refills: 6 | Status: SHIPPED | OUTPATIENT
Start: 2025-07-11

## 2025-07-11 RX ORDER — ROSUVASTATIN CALCIUM 5 MG/1
5 TABLET, COATED ORAL NIGHTLY
Qty: 90 TABLET | Refills: 3 | Status: SHIPPED | OUTPATIENT
Start: 2025-07-11

## 2025-09-02 ENCOUNTER — TELEPHONE (OUTPATIENT)
Dept: PRIMARY CARE | Facility: CLINIC | Age: 80
End: 2025-09-02
Payer: MEDICARE

## 2025-09-16 ENCOUNTER — APPOINTMENT (OUTPATIENT)
Dept: PRIMARY CARE | Facility: CLINIC | Age: 80
End: 2025-09-16
Payer: MEDICARE

## 2025-09-23 ENCOUNTER — APPOINTMENT (OUTPATIENT)
Dept: PRIMARY CARE | Facility: CLINIC | Age: 80
End: 2025-09-23
Payer: MEDICARE